# Patient Record
Sex: FEMALE | Race: BLACK OR AFRICAN AMERICAN | NOT HISPANIC OR LATINO | Employment: FULL TIME | URBAN - METROPOLITAN AREA
[De-identification: names, ages, dates, MRNs, and addresses within clinical notes are randomized per-mention and may not be internally consistent; named-entity substitution may affect disease eponyms.]

---

## 2019-10-08 ENCOUNTER — HOSPITAL ENCOUNTER (OUTPATIENT)
Dept: URGENT CARE | Facility: CLINIC | Age: 24
Discharge: HOME | End: 2019-10-08
Attending: FAMILY MEDICINE

## 2020-01-13 ENCOUNTER — HOSPITAL ENCOUNTER (OUTPATIENT)
Dept: URGENT CARE | Facility: CLINIC | Age: 25
Discharge: HOME | End: 2020-01-13

## 2020-01-15 LAB — BACTERIA SPEC AEROBE CULT: NORMAL

## 2020-10-19 ENCOUNTER — OFFICE VISIT CONVERTED (OUTPATIENT)
Dept: FAMILY MEDICINE CLINIC | Facility: CLINIC | Age: 25
End: 2020-10-19
Attending: NURSE PRACTITIONER

## 2020-10-27 ENCOUNTER — HOSPITAL ENCOUNTER (OUTPATIENT)
Dept: OTHER | Facility: HOSPITAL | Age: 25
Discharge: HOME | End: 2020-10-27
Attending: NURSE PRACTITIONER

## 2020-10-30 ENCOUNTER — HOSPITAL ENCOUNTER (OUTPATIENT)
Dept: FAMILY MEDICINE CLINIC | Facility: CLINIC | Age: 25
Discharge: HOME | End: 2020-10-30
Attending: NURSE PRACTITIONER

## 2020-10-30 ENCOUNTER — OFFICE VISIT CONVERTED (OUTPATIENT)
Dept: FAMILY MEDICINE CLINIC | Facility: CLINIC | Age: 25
End: 2020-10-30
Attending: NURSE PRACTITIONER

## 2020-10-30 LAB
C TRACH RRNA CVX QL NAA+PROBE: NOT DETECTED
N GONORRHOEA DNA SPEC QL NAA+PROBE: NOT DETECTED

## 2020-11-03 LAB
CONV LAST MENSTURAL PERIOD: NORMAL
SPECIMEN SOURCE: NORMAL
SPECIMEN SOURCE: NORMAL
THIN PREP CVX: NORMAL

## 2020-12-18 ENCOUNTER — HOSPITAL ENCOUNTER (OUTPATIENT)
Dept: URGENT CARE | Facility: CLINIC | Age: 25
Discharge: HOME | End: 2020-12-18
Attending: FAMILY MEDICINE

## 2021-05-01 ENCOUNTER — HOSPITAL ENCOUNTER (OUTPATIENT)
Dept: URGENT CARE | Facility: CLINIC | Age: 26
Discharge: HOME | End: 2021-05-01
Attending: EMERGENCY MEDICINE

## 2021-05-02 LAB — SARS-COV-2 RNA SPEC QL NAA+PROBE: NOT DETECTED

## 2021-05-13 NOTE — PROGRESS NOTES
Progress Note      Patient Name: Analisa Isaac   Patient ID: [id number]   Sex: Female   YOB: 1995    Primary Care Provider: Arabella DUNCAN   Referring Provider: Arabella DUNCAN    Visit Date: October 19, 2020    Provider: PERLA Richard   Location: [facility]   Location Address: [address] [place] [address], [address] [place] [address]  [id number]   Location Phone: [phone]          Chief Complaint  · establish care  · left knee pain, gives out on her x 1 month      History Of Present Illness  Analisa Isaac is a 24 year old /Black female who presents for evaluation and treatment of:      She is here for an acute visit but also to establish care.  She is a previous patient of PERLA Morrison and has not been here for almost 3 years.    She is complaining of left knee pain for about 1 month.  She states that it sometimes feels like her knee is going to give out on her.  She denies any injuries or falls.  She has not tried any over-the-counter medications.  She states it is worse when she is weight bearing, some better when she rests.  She has tried a knee brace which helped some.    History of asthma: She uses Ventolin inhaler as needed only and needs a refill.    She has not had a Pap smear since she was 20.       Past Medical History  Disease Name Date Onset Notes   Asthma --  --          Medication List  Name Date Started Instructions   Ventolin HFA 90 mcg/actuation inhalation HFA aerosol inhaler 11/14/2017 inhale 1 - 2 puffs (90 - 180 mcg) by inhalation route every 4-6 hours as needed         Allergy List  Allergen Name Date Reaction Notes   NO KNOWN DRUG ALLERGIES --  --  --          Family Medical History  Disease Name Relative/Age Notes   Breast Neoplasm Aunt/40   maternal         Reproductive History  Menstrual   Last Menstrual Period: 01/01/2012   Pregnancy Summary   Total Pregnancies: 0 Full Term: 0 Premature: 0   Ab Induced: 0 Ab  "Spontaneous: 0 Ectopics: 0   Multiples: 0 Livin         Social History  Finding Status Start/Stop Quantity Notes   Alcohol Current some day --/-- --  --    Tobacco Never --/-- --  --          Review of Systems  · Constitutional  o Denies  o : fever, fatigue, weight loss, weight gain  · Cardiovascular  o Denies  o : lower extremity edema, claudication, chest pressure, palpitations  · Respiratory  o Denies  o : shortness of breath, wheezing, cough, hemoptysis, dyspnea on exertion  · Gastrointestinal  o Denies  o : nausea, vomiting, diarrhea, constipation, abdominal pain  · Genitourinary  o Denies  o : urgency, frequency, possible pregnancy  · Integument  o Denies  o : rash, itching  · Musculoskeletal  o Admits  o : knee pain  o Denies  o : joint swelling, limitation of motion      Vitals  Date Time BP Position Site L\R Cuff Size HR RR TEMP (F) WT  HT  BMI kg/m2 BSA m2 O2 Sat FR L/min FiO2        10/19/2020 09:18 /90 Sitting    95 - R  97.8 260lbs 0oz 5'  6\" 41.96 2.34 96 %            Physical Examination  · Constitutional  o Appearance  o : no acute distress, well-nourished  · Head and Face  o Head  o :   § Inspection  § : atraumatic, normocephalic  · Neck  o Thyroid  o : gland size normal, nontender, no nodules or masses present on palpation, symmetric  · Respiratory  o Respiratory Effort  o : breathing comfortably, symmetric chest rise  o Auscultation of Lungs  o : clear to asculatation bilaterally, no wheezes, rales, or rhonchii  · Cardiovascular  o Heart  o :   § Auscultation of Heart  § : regular rate and rhythm, no murmurs, rubs, or gallops  o Peripheral Vascular System  o :   § Extremities  § : no edema  · Lymphatic  o Neck  o : no lymphadenopathy present  · Neurologic  o Mental Status Examination  o :   § Orientation  § : grossly oriented to person, place and time  o Gait and Station  o :   § Gait Screening  § : normal gait  · Psychiatric  o General  o : normal mood and affect  · Right " Knee  o Inspection  o : no redness, no swelling, no deformity, no bruising, no effusion  o Palpation  o : non-tender  · Left Knee  o Inspection  o : no redness, no swelling, no deformity, no bruising, no effusion  o Palpation  o : non-tender          Assessment  · Asthma     493.90/J45.909  · Left knee pain     719.46/M25.562      Plan  · Orders  o ACO-39: Current medications updated and reviewed (1159F, ) - - 10/19/2020  o Knee (Left) 3 views X-Ray [facility]erred [facility] ([id number]) - 719.46/M25.562 - 10/19/2020  · Medications  o meloxicam 15 mg oral tablet   SIG: take 1 tablet (15 mg) by oral route once daily for 30 days   DISP: (30) Tablet with 0 refills  Prescribed on 10/19/2020     o Ventolin HFA 90 mcg/actuation inhalation HFA aerosol inhaler   SIG: inhale 1 - 2 puffs (90 - 180 mcg) by inhalation route every 4-6 hours as needed for 30 days   DISP: (1) Inhaler with 5 refills  Adjusted on 10/19/2020     o Medications have been Reconciled  o Transition of Care or Provider Policy  · Instructions  o Patient was educated/instructed on their diagnosis, treatment and medications prior to discharge from the clinic today.  o Patient instructed to seek medical attention urgently for new or worsening symptoms.  o Call the office with any concerns or questions.     We will get an x-ray of her left knee and will start her on meloxicam 15 mg once daily.  I advised her not to take any over-the-counter NSAIDs but she can take Tylenol if needed.    Patient to schedule Pap smear and we will reevaluate her knee at that time.             Electronically Signed by: PERLA Richard -Author on October 19, 2020 09:40:57 AM

## 2021-05-13 NOTE — PROGRESS NOTES
Progress Note      Patient Name: Analisa Isaac   Patient ID: [id number]   Sex: Female   YOB: 1995    Primary Care Provider: Arabella DUNCAN   Referring Provider: Arabella DUNCAN    Visit Date: October 30, 2020    Provider: PERLA Richard   [place] [facility]   [place] Address: [address] [place] [address], [address]  [id number]   Location Phone: [phone]          Chief Complaint  · Annual Exam  · PAP exam  · (Health Maintainence Information Reviewed Under Results)      History Of Present Illness  Last PAP Smear: 2015.   Date of Last Mammogram: N/A.   Date of Last Colonoscopy: N/A   No current complaints.   Analisa Isaac is a 25 year old /Black female who presents for evaluation and treatment of:      She is here for Pap smear.  Last menstrual period was 10/8/2020.  She denies any complaints but she would like to have STD testing.    She has history of left knee pain and was seen on 10/19/2020.  X-ray of the left knee was negative.  She is still complaining of pain in her left knee.  She was given a prescription for meloxicam but states she has not tried it yet.  She states she will try it this weekend.       Past Medical History  Disease Name Date Onset Notes   Asthma --  --    Asthma 10/19/2020 --    Left knee pain 10/19/2020 --          Medication List  Name Date Started Instructions   meloxicam 15 mg oral tablet 10/19/2020 take 1 tablet (15 mg) by oral route once daily for 30 days   Ventolin HFA 90 mcg/actuation inhalation HFA aerosol inhaler 10/19/2020 inhale 1 - 2 puffs (90 - 180 mcg) by inhalation route every 4-6 hours as needed for 30 days         Allergy List  Allergen Name Date Reaction Notes   NO KNOWN DRUG ALLERGIES --  --  --          Family Medical History  Disease Name Relative/Age Notes   Breast Neoplasm Aunt/40   maternal         Reproductive History  Menstrual   Last Menstrual Period: 01/01/2012  "  Pregnancy Summary   Total Pregnancies: 0 Full Term: 0 Premature: 0   Ab Induced: 0 Ab Spontaneous: 0 Ectopics: 0   Multiples: 0 Livin         Social History  Finding Status Start/Stop Quantity Notes   Alcohol Current some day --/-- --  --    Tobacco Never --/-- --  --          Review of Systems  · Constitutional  o Denies  o : appetite change, fatigue, night sweats, weight gain, weight loss  · HENT  o Denies  o : hearing loss, tinnitus, vertigo, nasal discharge, nose bleeding, dental problems, oral lesions, sore throat  · Breasts  o Denies  o : lumps, tenderness, swelling, nipple discharge  · Cardiovascular  o Denies  o : chest pain, decreased exercise tolerance, dyspnea on exertion, palpitations  · Respiratory  o Denies  o : cough, shortness of breath, wheezing, snoring, apneas  · Gastrointestinal  o Denies  o : abdominal pain, nausea, vomiting, dysphagia, heartburn, changes in bowel habits, constipation, diarrhea  · Genitourinary  o Denies  o : dysuria, hematuria, incontinence, nocturia, frequency, urgency, sexual dysfunction  · Neurologic  o Denies  o : abnormal gait, facial weakness, headache, memory difficulties, tingling or numbness, seizures, tremors  · Musculoskeletal  o Admits  o : knee pain  o Denies  o : joint swelling  · Psychiatric  o Denies  o : anxiety, decreased concentration, irritability, panic attacks, sleep distrubances, sadness/tearfulness      Vitals  Date Time BP Position Site L\R Cuff Size HR RR TEMP (F) WT  HT  BMI kg/m2 BSA m2 O2 Sat FR L/min FiO2        10/30/2020 10:41 /80 Sitting    99 - R  98 262lbs 8oz 5'  6\" 42.37 2.35 96 %            Physical Examination  · Constitutional  o Appearance  o : well-nourished, in no acute distress  · Neck  o Inspection/Palpation  o : normal appearance, no masses or tenderness, trachea midline  o Thyroid  o : gland size normal, nontender, no nodules or masses present on palpation  · Respiratory  o Respiratory Effort  o : breathing " unlabored  o Inspection of Chest  o : normal appearance  o Auscultation of Lungs  o : normal breath sounds throughout  · Cardiovascular  o Heart  o :   § Auscultation of Heart  § : regular rate and rhythm, no murmurs, gallops or rubs  · Breasts  o Inspection of Breasts  o : breasts symmetrical, no skin changes, no deformities present, no discharge present  o Palpation of Breasts, Axillae  o : no masses present on palpation, no breast tenderness  · Gastrointestinal  o Abdominal Examination  o : abdomen nontender to palpation  · Genitourinary  o External Genitalia  o : no inflammation, no lesions present  o Vagina  o : normal vaginal vault, no discharge present, no inflammatory lesions present, no masses present  o Bladder  o : nontender to palpation  o Cervix  o : cervix not visualized due to obesity of patient, nontender to palpation, no discharges, no bleeding present  o Uterus  o : nontender to palpation, no masses present, position midline/midplane  o Adnexa  o : no tenderness or masses present on bimanual examination  o Perineum  o : perineum within normal limits  · Lymphatic  o Neck  o : no lymphadenopathy present  o Axilla  o : no lymphadenopathy present  · Neurologic  o Mental Status Examination  o :   § Orientation  § : grossly oriented to person, place and time  o Gait and Station  o : normal gait, able to stand without difficulty  · Psychiatric  o Judgement and Insight  o : judgment and insight intact  o Mood and Affect  o : mood normal, affect appropriate          Assessment  · Routine gynecological examination     V72.31/Z01.419  · Need for Prophylactic Flu Vaccine     V04.81  · Pap Smear     V76.2/Z01.419  · Left knee pain     719.46/M25.562      Plan  · Orders  o Vaginal Screen (Candida, Gardnerella & Trichomonas) (71746) - V72.31/Z01.419 - 10/30/2020  o Chlamydia/GC by PCR (Urine or Vaginal Swab) [facility] ([facility]) - V72.31/Z01.419 - 10/30/2020  o Pap smear (65374) - V76.2/Z01.419 -  10/30/2020  o Immunization Admin Fee (Single) ([facility]) (49986) - V04.81 - 10/30/2020  o ACO-39: Current medications updated and reviewed (, 1159F) - - 10/30/2020  o Fluzone Quadrivalent Vaccine, age 6 months + (65779) - V04.81 - 10/30/2020   Vaccine - Influenza; Dose: 0.5; Site: Left Deltoid; Route: Intramuscular; Date: 10/30/2020 11:08:00; Exp: 06/30/2021; Lot: [id number]; Mfg: sanofi pasteur; TradeName: Fluzone Quadrivalent; Administered By: Danny Lu MA; Comment: Patient tolerated injection well, left in stable condition.  · Medications  o Medications have been Reconciled  o Transition of Care or Provider Policy  · Instructions  o **Pap Test/Liquid Based:   o Thin Prep  o Source:   o Cervix  o ********  o **Perform Reflex Human Papilloma Virus (HPV) High Risk on this Pap (If atypical squamous cells of the undetermined signifigcance (ASCUS)/Atypical Glandular Cells of undetermined significance (AGCUS): Low Grade Squamous Intraepitheal lesion (LGSIL): **  o ********  o Medicare:  o No  o **Is this an annual PAP:  o Yes  o **Clinical History  o Last Menstrual Period (First Day of): 10/08/2020  o Irregular Mensus:  o Previous Pap date: 2015  o Counseled on monthly breast self exams.   o Counseled on STD prevention.  o Patient was educated/instructed on their diagnosis, treatment and medications prior to discharge from the clinic today.  o Patient instructed to seek medical attention urgently for new or worsening symptoms.  o Call the office with any concerns or questions.  · Disposition  o Call or Return if symptoms worsen or persist.            Electronically Signed by: PERLA Richard -Author on October 30, 2020 12:09:55 PM

## 2021-05-14 VITALS
TEMPERATURE: 97.8 F | WEIGHT: 260 LBS | SYSTOLIC BLOOD PRESSURE: 146 MMHG | OXYGEN SATURATION: 96 % | HEIGHT: 66 IN | HEART RATE: 95 BPM | DIASTOLIC BLOOD PRESSURE: 90 MMHG | BODY MASS INDEX: 41.78 KG/M2

## 2021-05-14 VITALS
SYSTOLIC BLOOD PRESSURE: 140 MMHG | DIASTOLIC BLOOD PRESSURE: 80 MMHG | HEART RATE: 99 BPM | TEMPERATURE: 98 F | BODY MASS INDEX: 42.19 KG/M2 | WEIGHT: 262.5 LBS | OXYGEN SATURATION: 96 % | HEIGHT: 66 IN

## 2021-06-03 PROBLEM — J45.909 ASTHMA: Status: ACTIVE | Noted: 2021-06-03

## 2021-06-03 PROBLEM — M25.562 LEFT KNEE PAIN: Status: ACTIVE | Noted: 2021-06-03

## 2021-06-03 RX ORDER — MELOXICAM 15 MG/1
15 TABLET ORAL DAILY
COMMUNITY
End: 2021-06-07

## 2021-06-03 RX ORDER — ALBUTEROL SULFATE 90 UG/1
2 AEROSOL, METERED RESPIRATORY (INHALATION) EVERY 4 HOURS PRN
COMMUNITY
End: 2022-05-16 | Stop reason: SDUPTHER

## 2021-06-07 ENCOUNTER — OFFICE VISIT (OUTPATIENT)
Dept: FAMILY MEDICINE CLINIC | Facility: CLINIC | Age: 26
End: 2021-06-07

## 2021-06-07 VITALS
WEIGHT: 268 LBS | BODY MASS INDEX: 44.65 KG/M2 | SYSTOLIC BLOOD PRESSURE: 124 MMHG | OXYGEN SATURATION: 96 % | TEMPERATURE: 98.7 F | DIASTOLIC BLOOD PRESSURE: 84 MMHG | HEART RATE: 83 BPM | HEIGHT: 65 IN

## 2021-06-07 DIAGNOSIS — J45.20 MILD INTERMITTENT ASTHMA WITHOUT COMPLICATION: Primary | ICD-10-CM

## 2021-06-07 DIAGNOSIS — E66.01 CLASS 3 SEVERE OBESITY DUE TO EXCESS CALORIES WITH BODY MASS INDEX (BMI) OF 40.0 TO 44.9 IN ADULT, UNSPECIFIED WHETHER SERIOUS COMORBIDITY PRESENT (HCC): ICD-10-CM

## 2021-06-07 DIAGNOSIS — Z83.3 FAMILY HISTORY OF DIABETES MELLITUS: ICD-10-CM

## 2021-06-07 PROBLEM — J45.909 ASTHMA: Status: RESOLVED | Noted: 2021-06-03 | Resolved: 2021-06-07

## 2021-06-07 PROBLEM — M25.562 LEFT KNEE PAIN: Status: RESOLVED | Noted: 2020-10-19 | Resolved: 2021-06-07

## 2021-06-07 PROBLEM — M25.562 LEFT KNEE PAIN: Status: ACTIVE | Noted: 2020-10-19

## 2021-06-07 PROBLEM — J45.909 ASTHMA: Status: ACTIVE | Noted: 2020-10-19

## 2021-06-07 PROCEDURE — 84443 ASSAY THYROID STIM HORMONE: CPT | Performed by: NURSE PRACTITIONER

## 2021-06-07 PROCEDURE — 83036 HEMOGLOBIN GLYCOSYLATED A1C: CPT | Performed by: NURSE PRACTITIONER

## 2021-06-07 PROCEDURE — 85025 COMPLETE CBC W/AUTO DIFF WBC: CPT | Performed by: NURSE PRACTITIONER

## 2021-06-07 PROCEDURE — 80061 LIPID PANEL: CPT | Performed by: NURSE PRACTITIONER

## 2021-06-07 PROCEDURE — 99213 OFFICE O/P EST LOW 20 MIN: CPT | Performed by: NURSE PRACTITIONER

## 2021-06-07 PROCEDURE — 80053 COMPREHEN METABOLIC PANEL: CPT | Performed by: NURSE PRACTITIONER

## 2021-06-07 NOTE — ASSESSMENT & PLAN NOTE
We will check labs today to include an A1c's with her family history of diabetes.  She will schedule a Pap smear for November 2021.

## 2021-06-07 NOTE — PROGRESS NOTES
"Chief Complaint  Asthma    Subjective          DesMonique Mell Isaac presents to [facility]  She presents today for follow-up and she would like to have an A1c checked.  She states she does have some family that has diabetes.  She is obese with a BMI of 44.  She has not had any recent lab work.    She does have asthma which is only intermittent.  She has an albuterol inhaler to use as needed but states she rarely has to use it.    Her last Pap smear was 10/30/2020 which was normal.  That was her first Pap smear and I discussed with her that she will need to have annual Pap smears x3 and then she can go to every 2 to 3 years as long as they are normal.    Objective   Vital Signs:   /84   Pulse 83   Temp 98.7 °F (37.1 °C)   Ht 165.1 cm (65\")   Wt 122 kg (268 lb)   SpO2 96%   BMI 44.60 kg/m²     Physical Exam  Vitals reviewed.   Constitutional:       Appearance: Normal appearance. She is well-developed.   HENT:      Mouth/Throat:      Pharynx: No oropharyngeal exudate.   Neck:      Thyroid: No thyroid mass, thyromegaly or thyroid tenderness.   Cardiovascular:      Rate and Rhythm: Normal rate and regular rhythm.      Heart sounds: No murmur heard.   No friction rub. No gallop.    Pulmonary:      Effort: Pulmonary effort is normal.      Breath sounds: Normal breath sounds. No wheezing or rhonchi.   Musculoskeletal:      Cervical back: Normal range of motion and neck supple.   Lymphadenopathy:      Cervical: No cervical adenopathy.   Skin:     General: Skin is warm and dry.   Neurological:      Mental Status: She is alert and oriented to person, place, and time.      Cranial Nerves: No cranial nerve deficit.   Psychiatric:         Mood and Affect: Mood and affect normal.         Behavior: Behavior normal.         Thought Content: Thought content normal. Thought content does not include homicidal or suicidal ideation.         Judgment: Judgment normal.        Result Review : "                 Assessment and Plan    Diagnoses and all orders for this visit:    1. Mild intermittent asthma without complication (Primary)  -     Hemoglobin A1c  -     Lipid Panel  -     Comprehensive Metabolic Panel  -     CBC & Differential  -     TSH Rfx On Abnormal To Free T4    2. Class 3 severe obesity due to excess calories with body mass index (BMI) of 40.0 to 44.9 in adult, unspecified whether serious comorbidity present (CMS/[facility])  Assessment & Plan:  We will check labs today to include an A1c's with her family history of diabetes.  She will schedule a Pap smear for November 2021.    Orders:  -     Hemoglobin A1c  -     Lipid Panel  -     Comprehensive Metabolic Panel  -     CBC & Differential  -     TSH Rfx On Abnormal To Free T4    3. Family history of diabetes mellitus  -     Hemoglobin A1c  -     Lipid Panel  -     Comprehensive Metabolic Panel  -     CBC & Differential  -     TSH Rfx On Abnormal To Free T4      Follow Up   Return in about 21 weeks (around 11/1/2021) for Pap smear.  Patient was given instructions and counseling regarding her condition or for health maintenance advice. Please see specific information pulled into the AVS if appropriate.

## 2021-06-08 LAB
ALBUMIN SERPL-MCNC: 4.9 G/DL (ref 3.5–5.2)
ALBUMIN/GLOB SERPL: 1.9 G/DL
ALP SERPL-CCNC: 64 U/L (ref 39–117)
ALT SERPL W P-5'-P-CCNC: 39 U/L (ref 1–33)
ANION GAP SERPL CALCULATED.3IONS-SCNC: 8.1 MMOL/L (ref 5–15)
AST SERPL-CCNC: 30 U/L (ref 1–32)
BASOPHILS # BLD AUTO: 0.05 10*3/MM3 (ref 0–0.2)
BASOPHILS NFR BLD AUTO: 0.8 % (ref 0–1.5)
BILIRUB SERPL-MCNC: 0.4 MG/DL (ref 0–1.2)
BUN SERPL-MCNC: 9 MG/DL (ref 6–20)
BUN/CREAT SERPL: 16.7 (ref 7–25)
CALCIUM SPEC-SCNC: 9.8 MG/DL (ref 8.6–10.5)
CHLORIDE SERPL-SCNC: 102 MMOL/L (ref 98–107)
CHOLEST SERPL-MCNC: 184 MG/DL (ref 0–200)
CO2 SERPL-SCNC: 29.9 MMOL/L (ref 22–29)
CREAT SERPL-MCNC: 0.54 MG/DL (ref 0.57–1)
DEPRECATED RDW RBC AUTO: 40.6 FL (ref 37–54)
EOSINOPHIL # BLD AUTO: 0.17 10*3/MM3 (ref 0–0.4)
EOSINOPHIL NFR BLD AUTO: 2.7 % (ref 0.3–6.2)
ERYTHROCYTE [DISTWIDTH] IN BLOOD BY AUTOMATED COUNT: 12.2 % (ref 12.3–15.4)
GFR SERPL CREATININE-BSD FRML MDRD: >150 ML/MIN/1.73
GLOBULIN UR ELPH-MCNC: 2.6 GM/DL
GLUCOSE SERPL-MCNC: 126 MG/DL (ref 65–99)
HBA1C MFR BLD: 6.6 % (ref 4.8–5.6)
HCT VFR BLD AUTO: 42.6 % (ref 34–46.6)
HDLC SERPL-MCNC: 30 MG/DL (ref 40–60)
HGB BLD-MCNC: 14.1 G/DL (ref 12–15.9)
IMM GRANULOCYTES # BLD AUTO: 0.02 10*3/MM3 (ref 0–0.05)
IMM GRANULOCYTES NFR BLD AUTO: 0.3 % (ref 0–0.5)
LDLC SERPL CALC-MCNC: 131 MG/DL (ref 0–100)
LDLC/HDLC SERPL: 4.31 {RATIO}
LYMPHOCYTES # BLD AUTO: 2.21 10*3/MM3 (ref 0.7–3.1)
LYMPHOCYTES NFR BLD AUTO: 35.2 % (ref 19.6–45.3)
MCH RBC QN AUTO: 29.5 PG (ref 26.6–33)
MCHC RBC AUTO-ENTMCNC: 33.1 G/DL (ref 31.5–35.7)
MCV RBC AUTO: 89.1 FL (ref 79–97)
MONOCYTES # BLD AUTO: 0.48 10*3/MM3 (ref 0.1–0.9)
MONOCYTES NFR BLD AUTO: 7.7 % (ref 5–12)
NEUTROPHILS NFR BLD AUTO: 3.34 10*3/MM3 (ref 1.7–7)
NEUTROPHILS NFR BLD AUTO: 53.3 % (ref 42.7–76)
NRBC BLD AUTO-RTO: 0.2 /100 WBC (ref 0–0.2)
PLATELET # BLD AUTO: 409 10*3/MM3 (ref 140–450)
PMV BLD AUTO: 10.9 FL (ref 6–12)
POTASSIUM SERPL-SCNC: 4.6 MMOL/L (ref 3.5–5.2)
PROT SERPL-MCNC: 7.5 G/DL (ref 6–8.5)
RBC # BLD AUTO: 4.78 10*6/MM3 (ref 3.77–5.28)
SODIUM SERPL-SCNC: 140 MMOL/L (ref 136–145)
TRIGL SERPL-MCNC: 124 MG/DL (ref 0–150)
TSH SERPL DL<=0.05 MIU/L-ACNC: 1.52 UIU/ML (ref 0.27–4.2)
VLDLC SERPL-MCNC: 23 MG/DL (ref 5–40)
WBC # BLD AUTO: 6.27 10*3/MM3 (ref 3.4–10.8)

## 2021-06-09 ENCOUNTER — TELEPHONE (OUTPATIENT)
Dept: FAMILY MEDICINE CLINIC | Facility: CLINIC | Age: 26
End: 2021-06-09

## 2021-08-22 NOTE — PROGRESS NOTES
"Chief Complaint  Follow-up (medication) and Rash (on back and neck x 1 week)    Subjective          DesMjose jque Mell Isaac presents to [facility]  History of Present Illness  She presents today with complaints of rash on back and neck.  She states the rash started about 1 week ago but denies it being itchy.  She states the rashes on her left side of her mid back.  She has some dark lines on her neck but denies itching.  She denies any new soaps or detergents.    New onset diabetes type 2, non-insulin-dependent: She was started on Metformin recently and is following up.  She states she has some loose stools right after she takes the Metformin but she denies it being very bothersome and does not want to change medication.  Objective   Vital Signs:   /90   Pulse 88   Temp 97.8 °F (36.6 °C)   Ht 165.1 cm (65\")   Wt 120 kg (263 lb 9.6 oz)   SpO2 96%   BMI 43.87 kg/m²     Physical Exam  Vitals reviewed.   Constitutional:       Appearance: Normal appearance. She is well-developed.   Cardiovascular:      Rate and Rhythm: Normal rate and regular rhythm.      Heart sounds: No murmur heard.   No friction rub. No gallop.    Pulmonary:      Effort: Pulmonary effort is normal.      Breath sounds: Normal breath sounds. No wheezing or rhonchi.   Skin:     General: Skin is warm and dry.             Comments: Acanthosis nigricans noted to posterior neck.   Neurological:      Mental Status: She is alert and oriented to person, place, and time.      Cranial Nerves: No cranial nerve deficit.   Psychiatric:         Mood and Affect: Mood and affect normal.         Behavior: Behavior normal.         Thought Content: Thought content normal. Thought content does not include homicidal or suicidal ideation.         Judgment: Judgment normal.        Result Review :                 Assessment and Plan    Diagnoses and all orders for this visit:    1. Type 2 diabetes mellitus without complication, " without long-term current use of insulin (CMS/[facility]) (Primary)  Assessment & Plan:  Diabetes is newly identified.   Continue current treatment regimen.  Discussed foot care.  Reminded to get yearly retinal exam.  Diabetes will be reassessed in 3 months.      2. Rash and nonspecific skin eruption  Comments:  We will treat with triamcinolone cream.    3. Acanthosis nigricans  Assessment & Plan:  Discussed this condition is related to her diabetes.      Other orders  -     triamcinolone (KENALOG) 0.1 % ointment; Apply  topically to the appropriate area as directed 2 (Two) Times a Day.  Dispense: 15 g; Refill: 0      Follow Up   Return for has follow up apt scheduled 11/5/21.  Patient was given instructions and counseling regarding her condition or for health maintenance advice. Please see specific information pulled into the AVS if appropriate.

## 2021-08-23 ENCOUNTER — OFFICE VISIT (OUTPATIENT)
Dept: FAMILY MEDICINE CLINIC | Facility: CLINIC | Age: 26
End: 2021-08-23

## 2021-08-23 VITALS
BODY MASS INDEX: 43.92 KG/M2 | HEART RATE: 88 BPM | OXYGEN SATURATION: 96 % | DIASTOLIC BLOOD PRESSURE: 90 MMHG | WEIGHT: 263.6 LBS | HEIGHT: 65 IN | TEMPERATURE: 97.8 F | SYSTOLIC BLOOD PRESSURE: 134 MMHG

## 2021-08-23 DIAGNOSIS — E11.9 TYPE 2 DIABETES MELLITUS WITHOUT COMPLICATION, WITHOUT LONG-TERM CURRENT USE OF INSULIN (HCC): Primary | ICD-10-CM

## 2021-08-23 DIAGNOSIS — L83 ACANTHOSIS NIGRICANS: ICD-10-CM

## 2021-08-23 DIAGNOSIS — R21 RASH AND NONSPECIFIC SKIN ERUPTION: ICD-10-CM

## 2021-08-23 PROCEDURE — 99213 OFFICE O/P EST LOW 20 MIN: CPT | Performed by: NURSE PRACTITIONER

## 2021-08-23 NOTE — ASSESSMENT & PLAN NOTE
Diabetes is newly identified.   Continue current treatment regimen.  Discussed foot care.  Reminded to get yearly retinal exam.  Diabetes will be reassessed in 3 months.

## 2021-09-02 NOTE — PROGRESS NOTES
"Chief Complaint  Leg Pain (numbness and tingling left leg)    Subjective          Analisa Mell Isaac presents to [facility]  History of Present Illness  She is here for an acute visit today.  She is complaining of numbness and tingling in her left leg from her knee down for the past few days.  She states the numbness and tingling starts about her knee and goes down to her toes on her left side.  She states her feet were swelling but are not swollen now.  She states on Wednesday she did have some mid back pain but it has improved.  She denies any pain in her knee or calf and denies any swelling in her leg.    She does have a new onset of diabetes type 2, non-insulin-dependent.  Her A1c was 6.6% 3 months ago.  She was started on Metformin and states she is tolerating the medication well.    Patient was also seen by Blanca Cordero, NP student.    Objective   Vital Signs:   /68   Pulse 86   Temp 98.1 °F (36.7 °C)   Ht 165.1 cm (65\")   Wt 118 kg (260 lb 3.2 oz)   SpO2 98%   BMI 43.30 kg/m²     Physical Exam  Vitals reviewed.   Constitutional:       Appearance: Normal appearance. She is well-developed.   Neck:      Thyroid: No thyroid mass, thyromegaly or thyroid tenderness.   Cardiovascular:      Rate and Rhythm: Normal rate and regular rhythm.      Heart sounds: No murmur heard.   No friction rub. No gallop.    Pulmonary:      Effort: Pulmonary effort is normal.      Breath sounds: Normal breath sounds. No wheezing or rhonchi.   Musculoskeletal:      Thoracic back: Spasms present. No tenderness.      Right lower leg: No swelling or tenderness.      Left lower leg: No swelling or tenderness.   Lymphadenopathy:      Cervical: No cervical adenopathy.   Skin:     General: Skin is warm and dry.   Neurological:      Mental Status: She is alert and oriented to person, place, and time.      Cranial Nerves: No cranial nerve deficit.   Psychiatric:         Mood and Affect: Mood " and affect normal.         Behavior: Behavior normal.         Thought Content: Thought content normal. Thought content does not include homicidal or suicidal ideation.         Judgment: Judgment normal.        Result Review :                 Assessment and Plan    Diagnoses and all orders for this visit:    1. Numbness and tingling of left leg (Primary)  Assessment & Plan:  I discussed with her that the numbness and tingling could be due to her back pain or it could be due to neuropathy from diabetes.  I also discussed it could be vitamin B12 deficiency so we will check her B12 level today.    Orders:  -     Vitamin B12 & Folate    2. Type 2 diabetes mellitus without complication, without long-term current use of insulin (CMS/[facility])  Assessment & Plan:  Diabetes is newly identified.   Continue current treatment regimen.  Diabetes will be reassessed with an A1c level with labs today.    Orders:  -     Hemoglobin A1c  -     CBC & Differential  -     Comprehensive Metabolic Panel    3. Acute left-sided thoracic back pain  Comments:  I will start her on ibuprofen 800 mg 3 times daily as needed and Flexeril 10 mg nightly as needed for her pain.  Advised not to drive while taking Flexeril.    Other orders  -     ibuprofen (ADVIL,MOTRIN) 800 MG tablet; Take 1 tablet by mouth Every 8 (Eight) Hours As Needed for Mild Pain .  Dispense: 90 tablet; Refill: 0  -     cyclobenzaprine (FLEXERIL) 10 MG tablet; Take 1 tablet by mouth At Night As Needed for Muscle Spasms.  Dispense: 30 tablet; Refill: 0      Follow Up   Return if symptoms worsen or fail to improve, for has f/u scheduled.  Patient was given instructions and counseling regarding her condition or for health maintenance advice. Please see specific information pulled into the AVS if appropriate.

## 2021-09-03 ENCOUNTER — OFFICE VISIT (OUTPATIENT)
Dept: FAMILY MEDICINE CLINIC | Facility: CLINIC | Age: 26
End: 2021-09-03

## 2021-09-03 VITALS
TEMPERATURE: 98.1 F | HEIGHT: 65 IN | OXYGEN SATURATION: 98 % | BODY MASS INDEX: 43.35 KG/M2 | DIASTOLIC BLOOD PRESSURE: 68 MMHG | WEIGHT: 260.2 LBS | HEART RATE: 86 BPM | SYSTOLIC BLOOD PRESSURE: 132 MMHG

## 2021-09-03 DIAGNOSIS — M54.6 ACUTE LEFT-SIDED THORACIC BACK PAIN: ICD-10-CM

## 2021-09-03 DIAGNOSIS — R20.0 NUMBNESS AND TINGLING OF LEFT LEG: Primary | ICD-10-CM

## 2021-09-03 DIAGNOSIS — R20.2 NUMBNESS AND TINGLING OF LEFT LEG: Primary | ICD-10-CM

## 2021-09-03 DIAGNOSIS — E11.9 TYPE 2 DIABETES MELLITUS WITHOUT COMPLICATION, WITHOUT LONG-TERM CURRENT USE OF INSULIN (HCC): ICD-10-CM

## 2021-09-03 LAB
ALBUMIN SERPL-MCNC: 5.1 G/DL (ref 3.5–5.2)
ALBUMIN/GLOB SERPL: 1.8 G/DL
ALP SERPL-CCNC: 70 U/L (ref 39–117)
ALT SERPL W P-5'-P-CCNC: 41 U/L (ref 1–33)
ANION GAP SERPL CALCULATED.3IONS-SCNC: 9.3 MMOL/L (ref 5–15)
AST SERPL-CCNC: 24 U/L (ref 1–32)
BASOPHILS # BLD AUTO: 0.03 10*3/MM3 (ref 0–0.2)
BASOPHILS NFR BLD AUTO: 0.5 % (ref 0–1.5)
BILIRUB SERPL-MCNC: 0.3 MG/DL (ref 0–1.2)
BUN SERPL-MCNC: 9 MG/DL (ref 6–20)
BUN/CREAT SERPL: 14.8 (ref 7–25)
CALCIUM SPEC-SCNC: 9.9 MG/DL (ref 8.6–10.5)
CHLORIDE SERPL-SCNC: 100 MMOL/L (ref 98–107)
CO2 SERPL-SCNC: 28.7 MMOL/L (ref 22–29)
CREAT SERPL-MCNC: 0.61 MG/DL (ref 0.57–1)
DEPRECATED RDW RBC AUTO: 37.8 FL (ref 37–54)
EOSINOPHIL # BLD AUTO: 0.19 10*3/MM3 (ref 0–0.4)
EOSINOPHIL NFR BLD AUTO: 2.9 % (ref 0.3–6.2)
ERYTHROCYTE [DISTWIDTH] IN BLOOD BY AUTOMATED COUNT: 12.2 % (ref 12.3–15.4)
FOLATE SERPL-MCNC: 9.02 NG/ML (ref 4.78–24.2)
GFR SERPL CREATININE-BSD FRML MDRD: 144 ML/MIN/1.73
GLOBULIN UR ELPH-MCNC: 2.9 GM/DL
GLUCOSE SERPL-MCNC: 88 MG/DL (ref 65–99)
HBA1C MFR BLD: 7.03 % (ref 4.8–5.6)
HCT VFR BLD AUTO: 40.8 % (ref 34–46.6)
HGB BLD-MCNC: 13.5 G/DL (ref 12–15.9)
IMM GRANULOCYTES # BLD AUTO: 0.03 10*3/MM3 (ref 0–0.05)
IMM GRANULOCYTES NFR BLD AUTO: 0.5 % (ref 0–0.5)
LYMPHOCYTES # BLD AUTO: 2.56 10*3/MM3 (ref 0.7–3.1)
LYMPHOCYTES NFR BLD AUTO: 39.6 % (ref 19.6–45.3)
MCH RBC QN AUTO: 28.5 PG (ref 26.6–33)
MCHC RBC AUTO-ENTMCNC: 33.1 G/DL (ref 31.5–35.7)
MCV RBC AUTO: 86.1 FL (ref 79–97)
MONOCYTES # BLD AUTO: 0.58 10*3/MM3 (ref 0.1–0.9)
MONOCYTES NFR BLD AUTO: 9 % (ref 5–12)
NEUTROPHILS NFR BLD AUTO: 3.08 10*3/MM3 (ref 1.7–7)
NEUTROPHILS NFR BLD AUTO: 47.5 % (ref 42.7–76)
NRBC BLD AUTO-RTO: 0 /100 WBC (ref 0–0.2)
PLATELET # BLD AUTO: 393 10*3/MM3 (ref 140–450)
PMV BLD AUTO: 11 FL (ref 6–12)
POTASSIUM SERPL-SCNC: 4.4 MMOL/L (ref 3.5–5.2)
PROT SERPL-MCNC: 8 G/DL (ref 6–8.5)
RBC # BLD AUTO: 4.74 10*6/MM3 (ref 3.77–5.28)
SODIUM SERPL-SCNC: 138 MMOL/L (ref 136–145)
VIT B12 BLD-MCNC: 496 PG/ML (ref 211–946)
WBC # BLD AUTO: 6.47 10*3/MM3 (ref 3.4–10.8)

## 2021-09-03 PROCEDURE — 82607 VITAMIN B-12: CPT | Performed by: NURSE PRACTITIONER

## 2021-09-03 PROCEDURE — 85025 COMPLETE CBC W/AUTO DIFF WBC: CPT | Performed by: NURSE PRACTITIONER

## 2021-09-03 PROCEDURE — 80053 COMPREHEN METABOLIC PANEL: CPT | Performed by: NURSE PRACTITIONER

## 2021-09-03 PROCEDURE — 83036 HEMOGLOBIN GLYCOSYLATED A1C: CPT | Performed by: NURSE PRACTITIONER

## 2021-09-03 PROCEDURE — 82746 ASSAY OF FOLIC ACID SERUM: CPT | Performed by: NURSE PRACTITIONER

## 2021-09-03 PROCEDURE — 99213 OFFICE O/P EST LOW 20 MIN: CPT | Performed by: NURSE PRACTITIONER

## 2021-09-03 RX ORDER — IBUPROFEN 800 MG/1
800 TABLET ORAL EVERY 8 HOURS PRN
Qty: 90 TABLET | Refills: 0 | OUTPATIENT
Start: 2021-09-03 | End: 2021-09-15

## 2021-09-03 RX ORDER — CYCLOBENZAPRINE HCL 10 MG
10 TABLET ORAL NIGHTLY PRN
Qty: 30 TABLET | Refills: 0 | Status: SHIPPED | OUTPATIENT
Start: 2021-09-03 | End: 2023-02-28

## 2021-09-03 NOTE — ASSESSMENT & PLAN NOTE
I discussed with her that the numbness and tingling could be due to her back pain or it could be due to neuropathy from diabetes.  I also discussed it could be vitamin B12 deficiency so we will check her B12 level today.

## 2021-09-03 NOTE — PATIENT INSTRUCTIONS
Acute Back Pain, Adult  Acute back pain is sudden and usually short-lived. It is often caused by an injury to the muscles and tissues in the back. The injury may result from:  · A muscle or ligament getting overstretched or torn (strained). Ligaments are tissues that connect bones to each other. Lifting something improperly can cause a back strain.  · Wear and tear (degeneration) of the spinal disks. Spinal disks are circular tissue that provide cushioning between the bones of the spine (vertebrae).  · Twisting motions, such as while playing sports or doing yard work.  · A hit to the back.  · Arthritis.  You may have a physical exam, lab tests, and imaging tests to find the cause of your pain. Acute back pain usually goes away with rest and home care.  Follow these instructions at home:  Managing pain, stiffness, and swelling  · Treatment may include medicines for pain and inflammation that are taken by mouth or applied to the skin, prescription pain medicine, or muscle relaxants. Take over-the-counter and prescription medicines only as told by your health care provider.  · Your health care provider may recommend applying ice during the first 24-48 hours after your pain starts. To do this:  ? Put ice in a plastic bag.  ? Place a towel between your skin and the bag.  ? Leave the ice on for 20 minutes, 2-3 times a day.  · If directed, apply heat to the affected area as often as told by your health care provider. Use the heat source that your health care provider recommends, such as a moist heat pack or a heating pad.  ? Place a towel between your skin and the heat source.  ? Leave the heat on for 20-30 minutes.  ? Remove the heat if your skin turns bright red. This is especially important if you are unable to feel pain, heat, or cold. You have a greater risk of getting burned.  Activity    · Do not stay in bed. Staying in bed for more than 1-2 days can delay your recovery.  · Sit up and stand up straight. Avoid  leaning forward when you sit or hunching over when you stand.  ? If you work at a desk, sit close to it so you do not need to lean over. Keep your chin tucked in. Keep your neck drawn back, and keep your elbows bent at a 90-degree angle (right angle).  ? Sit high and close to the steering wheel when you drive. Add lower back (lumbar) support to your car seat, if needed.  · Take short walks on even surfaces as soon as you are able. Try to increase the length of time you walk each day.  · Do not sit, drive, or  one place for more than 30 minutes at a time. Sitting or standing for long periods of time can put stress on your back.  · Do not drive or use heavy machinery while taking prescription pain medicine.  · Use proper lifting techniques. When you bend and lift, use positions that put less stress on your back:  ? Bend your knees.  ? Keep the load close to your body.  ? Avoid twisting.  · Exercise regularly as told by your health care provider. Exercising helps your back heal faster and helps prevent back injuries by keeping muscles strong and flexible.  · Work with a physical therapist to make a safe exercise program, as recommended by your health care provider. Do any exercises as told by your physical therapist.  Lifestyle  · Maintain a healthy weight. Extra weight puts stress on your back and makes it difficult to have good posture.  · Avoid activities or situations that make you feel anxious or stressed. Stress and anxiety increase muscle tension and can make back pain worse. Learn ways to manage anxiety and stress, such as through exercise.  General instructions  · Sleep on a firm mattress in a comfortable position. Try lying on your side with your knees slightly bent. If you lie on your back, put a pillow under your knees.  · Follow your treatment plan as told by your health care provider. This may include:  ? Cognitive or behavioral therapy.  ? Acupuncture or massage therapy.  ? Meditation or  yoga.  Contact a health care provider if:  · You have pain that is not relieved with rest or medicine.  · You have increasing pain going down into your legs or buttocks.  · Your pain does not improve after 2 weeks.  · You have pain at night.  · You lose weight without trying.  · You have a fever or chills.  Get help right away if:  · You develop new bowel or bladder control problems.  · You have unusual weakness or numbness in your arms or legs.  · You develop nausea or vomiting.  · You develop abdominal pain.  · You feel faint.  Summary  · Acute back pain is sudden and usually short-lived.  · Use proper lifting techniques. When you bend and lift, use positions that put less stress on your back.  · Take over-the-counter and prescription medicines and apply heat or ice as directed by your health care provider.  This information is not intended to replace advice given to you by your health care provider. Make sure you discuss any questions you have with your health care provider.  Document Revised: 12/11/2020 Document Reviewed: 08/01/2018  [employer] Patient Education © 2021 [employer]    Paresthesia  Paresthesia is a burning or prickling feeling. This feeling can happen in any part of the body. It often happens in the hands, arms, legs, or feet. Usually, it is not painful. In most cases, the feeling goes away in a short time and is not a sign of a serious problem. If you have paresthesia that lasts a long time, you may need to be seen by your doctor.  Follow these instructions at home:  Alcohol use    · Do not drink alcohol if:  ? Your doctor tells you not to drink.  ? You are pregnant, may be pregnant, or are planning to become pregnant.  · If you drink alcohol:  ? Limit how much you use to:  § 0-1 drink a day for women.  § 0-2 drinks a day for men.  ? Be aware of how much alcohol is in your drink. In the [place] one drink equals one 12 oz bottle of beer (355 mL), one 5 oz glass of wine (148 mL), or one 1½ oz glass  of hard liquor (44 mL).  Nutrition    · Eat a healthy diet. This includes:  ? Eating foods that have a lot of fiber in them, such as fresh fruits and vegetables, whole grains, and beans.  ? Limiting foods that have a lot of fat and processed sugars in them, such as fried or sweet foods.  General instructions  · Take over-the-counter and prescription medicines only as told by your doctor.  · Do not use any products that have nicotine or tobacco in them, such as cigarettes and e-cigarettes. If you need help quitting, ask your doctor.  · If you have diabetes, work with your doctor to make sure your blood sugar stays in a healthy range.  · If your feet feel numb:  ? Check for redness, warmth, and swelling every day.  ? Wear padded socks and comfortable shoes. These help protect your feet.  · Keep all follow-up visits as told by your doctor. This is important.  Contact a doctor if:  · You have paresthesia that gets worse or does not go away.  · Your burning or prickling feeling gets worse when you walk.  · You have pain or cramps.  · You feel dizzy.  · You have a rash.  Get help right away if you:  · Feel weak.  · Have trouble walking or moving.  · Have problems speaking, understanding, or seeing.  · Feel confused.  · Cannot control when you pee (urinate) or poop (have a bowel movement).  · Lose feeling (have numbness) after an injury.  · Have new weakness in an arm or leg.  · Pass out (faint).  Summary  · Paresthesia is a burning or prickling feeling. It often happens in the hands, arms, legs, or feet.  · In most cases, the feeling goes away in a short time and is not a sign of a serious problem.  · If you have paresthesia that lasts a long time, you may need to be seen by your doctor.  This information is not intended to replace advice given to you by your health care provider. Make sure you discuss any questions you have with your health care provider.  Document Revised: 01/13/2020 Document Reviewed:  12/27/2018  Elsevier Patient Education © 2021 [employer]

## 2021-09-03 NOTE — ASSESSMENT & PLAN NOTE
Diabetes is newly identified.   Continue current treatment regimen.  Diabetes will be reassessed with an A1c level with labs today.

## 2021-09-04 RX ORDER — DAPAGLIFLOZIN 10 MG/1
10 TABLET, FILM COATED ORAL DAILY
Qty: 30 TABLET | Refills: 5 | Status: SHIPPED | OUTPATIENT
Start: 2021-09-04 | End: 2021-12-17 | Stop reason: SDUPTHER

## 2021-10-04 RX ORDER — IBUPROFEN 800 MG/1
TABLET ORAL
Qty: 90 TABLET | Refills: 0 | OUTPATIENT
Start: 2021-10-04

## 2021-10-04 NOTE — TELEPHONE ENCOUNTER
I refused the ibuprofen refill on her because it looks like urgent care has prescribed her diclofenac and is contraindicated for her to take both of those.  If she is no longer taking the diclofenac we can send her in the ibuprofen.

## 2021-10-05 RX ORDER — IBUPROFEN 800 MG/1
800 TABLET ORAL EVERY 8 HOURS PRN
Qty: 90 TABLET | Refills: 2 | Status: SHIPPED | OUTPATIENT
Start: 2021-10-05 | End: 2023-03-25

## 2021-10-05 NOTE — TELEPHONE ENCOUNTER
Patient states she is no longer taking the diclofenac. I went to refill the ibuprofen but it is no longer there. Can you please fill it?

## 2021-12-17 ENCOUNTER — OFFICE VISIT (OUTPATIENT)
Dept: FAMILY MEDICINE CLINIC | Facility: CLINIC | Age: 26
End: 2021-12-17

## 2021-12-17 VITALS
OXYGEN SATURATION: 99 % | WEIGHT: 265 LBS | DIASTOLIC BLOOD PRESSURE: 88 MMHG | SYSTOLIC BLOOD PRESSURE: 130 MMHG | BODY MASS INDEX: 44.15 KG/M2 | TEMPERATURE: 99.1 F | HEIGHT: 65 IN | HEART RATE: 89 BPM

## 2021-12-17 DIAGNOSIS — L05.01 PILONIDAL CYST WITH ABSCESS: Primary | ICD-10-CM

## 2021-12-17 DIAGNOSIS — E11.9 TYPE 2 DIABETES MELLITUS WITHOUT COMPLICATION, WITHOUT LONG-TERM CURRENT USE OF INSULIN (HCC): ICD-10-CM

## 2021-12-17 PROCEDURE — 99214 OFFICE O/P EST MOD 30 MIN: CPT | Performed by: NURSE PRACTITIONER

## 2021-12-17 RX ORDER — DAPAGLIFLOZIN 10 MG/1
10 TABLET, FILM COATED ORAL DAILY
Qty: 30 TABLET | Refills: 5 | Status: SHIPPED | OUTPATIENT
Start: 2021-12-17 | End: 2022-05-16 | Stop reason: SDUPTHER

## 2021-12-17 NOTE — PATIENT INSTRUCTIONS
Pilonidal Cyst    A pilonidal cyst is a fluid-filled sac that forms beneath the skin near the tailbone, at the top of the crease of the buttocks (pilonidal area). If the cyst is not large and not infected, it may not cause any problems.  If the cyst becomes irritated or infected, it may get larger and fill with pus. An infected cyst is called an abscess. A pilonidal abscess may cause pain and swelling, and it may need to be drained or removed.  What are the causes?  The cause of this condition is not always known. In some cases, a hair that grows into your skin (ingrown hair) may be the cause.  What increases the risk?  You are more likely to get a pilonidal cyst if you:  · Are male.  · Have lots of hair near the crease of the buttocks.  · Are overweight.  · Have a dimple near the crease of the buttocks.  · Wear tight clothing.  · Do not bathe or shower often.  · Sit for long periods of time.  What are the signs or symptoms?  Signs and symptoms of a pilonidal cyst may include pain, swelling, redness, and warmth in the pilonidal area. Depending on how big the cyst is, you may be able to feel a lump near your tailbone. If your cyst becomes infected, symptoms may include:  · Pus or fluid drainage.  · Fever.  · Pain, swelling, and redness getting worse.  · The lump getting bigger.  How is this diagnosed?  This condition may be diagnosed based on:  · Your symptoms and medical history.  · A physical exam.  · A blood test to check for infection.  · Testing a pus sample, if applicable.  How is this treated?  If your cyst does not cause symptoms, you may not need any treatment. If your cyst bothers you or is infected, you may need a procedure to drain or remove the cyst. Depending on the size, location, and severity of your cyst, your health care provider may:  · Make an incision in the cyst and drain it (incision and drainage).  · Open and drain the cyst, and then stitch the wound so that it stays open while it heals  (marsupialization). You will be given instructions about how to care for your open wound while it heals.  · Remove all or part of the cyst, and then close the wound (cyst removal).  You may need to take antibiotic medicines before your procedure.  Follow these instructions at home:  Medicines  · Take over-the-counter and prescription medicines only as told by your health care provider.  · If you were prescribed an antibiotic medicine, take it as told by your health care provider. Do not stop taking the antibiotic even if you start to feel better.  General instructions  · Keep the area around your pilonidal cyst clean and dry.  · If there is fluid or pus draining from your cyst:  ? Cover the area with a clean bandage (dressing) as needed.  ? Wash the area gently with soap and water. Pat the area dry with a clean towel. Do not rub the area because that may cause bleeding.  · Remove hair from the area around the cyst only if your health care provider tells you to do this.  · Do not wear tight pants or sit in one position for long periods at a time.  · Keep all follow-up visits as told by your health care provider. This is important.  Contact a health care provider if you have:  · New redness, swelling, or pain.  · A fever.  · Severe pain.  Summary  · A pilonidal cyst is a fluid-filled sac that forms beneath the skin near the tailbone, at the top of the crease of the buttocks (pilonidal area).  · If the cyst becomes irritated or infected, it may get larger and fill with pus. An infected cyst is called an abscess.  · The cause of this condition is not always known. In some cases, a hair that grows into your skin (ingrown hair) may be the cause.  · If your cyst does not cause symptoms, you may not need any treatment. If your cyst bothers you or is infected, you may need a procedure to drain or remove the cyst.  This information is not intended to replace advice given to you by your health care provider. Make sure you  discuss any questions you have with your health care provider.  Document Revised: 12/06/2018 Document Reviewed: 12/06/2018  Elsevier Patient Education © 2021 [employer]

## 2021-12-17 NOTE — ASSESSMENT & PLAN NOTE
I will get her referred to general surgery.  Advised that if any worsening of her symptoms over the weekend to go to the emergency room.

## 2021-12-17 NOTE — PROGRESS NOTES
"Chief Complaint  Hospital Follow Up Visit (er)    Subjective          Analisa Mell Isaac presents to [facility]  History of Present Illness   She presents today for follow-up from the urgent care on 12/13/2021.  She has been diagnosed with a pilonidal cyst.  She was started on Bactrim.    Diabetes type 2, non-insulin-dependent: She is complaining that Metformin is causing her diarrhea.  She is also on Farxiga.  Her last A1c was 7.03% 3 months ago.  She was supposed to followed up last month but missed her appointment.  Objective   Vital Signs:   /88   Pulse 89   Temp 99.1 °F (37.3 °C)   Ht 165.1 cm (65\")   Wt 120 kg (265 lb)   SpO2 99%   BMI 44.10 kg/m²     Physical Exam  Vitals reviewed.   Constitutional:       Appearance: Normal appearance. She is well-developed.   Neck:      Thyroid: No thyroid mass, thyromegaly or thyroid tenderness.   Cardiovascular:      Rate and Rhythm: Normal rate and regular rhythm.      Heart sounds: No murmur heard.  No friction rub. No gallop.    Pulmonary:      Effort: Pulmonary effort is normal.      Breath sounds: Normal breath sounds. No wheezing or rhonchi.   Lymphadenopathy:      Cervical: No cervical adenopathy.   Skin:     General: Skin is warm and dry.          Neurological:      Mental Status: She is alert and oriented to person, place, and time.      Cranial Nerves: No cranial nerve deficit.   Psychiatric:         Mood and Affect: Mood and affect normal.         Behavior: Behavior normal.         Thought Content: Thought content normal. Thought content does not include homicidal or suicidal ideation.         Judgment: Judgment normal.        Result Review :                 Assessment and Plan    Diagnoses and all orders for this visit:    1. Pilonidal cyst with abscess (Primary)  Assessment & Plan:  I will get her referred to general surgery.  Advised that if any worsening of her symptoms over the weekend to go to the " emergency room.    Orders:  -     Ambulatory Referral to General Surgery    2. Type 2 diabetes mellitus without complication, without long-term current use of insulin ([facility])  Assessment & Plan:  Patient not tolerating Metformin, will discontinue and start her on Januvia.  She will continue Farxiga.  Patient will return for fasting labs next week and will follow up with me in 2 weeks.    Orders:  -     Hemoglobin A1c; Future  -     Comprehensive Metabolic Panel; Future  -     Lipid Panel; Future  -     CBC Auto Differential; Future    Other orders  -     SITagliptin (Januvia) 50 MG tablet; Take 1 tablet by mouth Daily.  Dispense: 30 tablet; Refill: 0  -     Dapagliflozin Propanediol (Farxiga) 10 MG tablet; Take 10 mg by mouth Daily.  Dispense: 30 tablet; Refill: 5      Follow Up   Return in about 2 weeks (around 12/31/2021) for Next scheduled follow up DIABETES.  Patient was given instructions and counseling regarding her condition or for health maintenance advice. Please see specific information pulled into the AVS if appropriate.

## 2021-12-17 NOTE — ASSESSMENT & PLAN NOTE
Patient not tolerating Metformin, will discontinue and start her on Januvia.  She will continue Farxiga.  Patient will return for fasting labs next week and will follow up with me in 2 weeks.

## 2021-12-20 ENCOUNTER — PATIENT MESSAGE (OUTPATIENT)
Dept: FAMILY MEDICINE CLINIC | Facility: CLINIC | Age: 26
End: 2021-12-20

## 2021-12-20 ENCOUNTER — OFFICE VISIT (OUTPATIENT)
Dept: SURGERY | Facility: CLINIC | Age: 26
End: 2021-12-20

## 2021-12-20 VITALS — HEIGHT: 65 IN | RESPIRATION RATE: 16 BRPM | BODY MASS INDEX: 43.32 KG/M2 | WEIGHT: 260 LBS

## 2021-12-20 DIAGNOSIS — L05.01 PILONIDAL ABSCESS: Primary | ICD-10-CM

## 2021-12-20 PROCEDURE — 99202 OFFICE O/P NEW SF 15 MIN: CPT | Performed by: SURGERY

## 2021-12-20 RX ORDER — BLOOD-GLUCOSE METER
EACH MISCELLANEOUS
COMMUNITY
Start: 2021-12-17

## 2021-12-20 RX ORDER — LANCETS
1 EACH MISCELLANEOUS DAILY
COMMUNITY
Start: 2021-12-17 | End: 2022-05-16 | Stop reason: SDUPTHER

## 2021-12-20 RX ORDER — BLOOD SUGAR DIAGNOSTIC
1 STRIP MISCELLANEOUS DAILY
COMMUNITY
Start: 2021-12-17 | End: 2022-05-16 | Stop reason: SDUPTHER

## 2021-12-20 NOTE — PROGRESS NOTES
Chief Complaint:  No chief complaint on file.    Primary Care Provider: Lena Kelly APRN    Referring Provider: Lena Kelly A*    History of Present Illness  Analisa Isaac is a 26 y.o. female referred by TYLER Riggs* for evaluation for an area at her upper buttock that was very painful and swollen last week.  She was placed on oral antibiotics and is finishing the oral antibiotics now.  Toward the end of last week the area started draining fluid and now feels completely fine and the patient has no pain at all.  Patient has had pain on and off at her buttock but she has never had an area that popped open to drain fluid.    Allergies: Latex, natural rubber and Metformin    No outpatient medications have been marked as taking for the 12/20/21 encounter (Appointment) with Cam Rutherford MD.       Past Medical History:   • Asthma   • Diabetes mellitus (HCC)   • Left knee pain        No past surgical history on file.    Family History:   Family History   Problem Relation Age of Onset   • Breast cancer Maternal Aunt 40   • Diabetes Other         Social History:  Social History     Tobacco Use   • Smoking status: Never Smoker   • Smokeless tobacco: Never Used   Substance Use Topics   • Alcohol use: Yes     Comment: current some day        Objective     Vital Signs:  There were no vitals taken for this visit.  • Constitutional: here alone, alert, no acute distress, reliable historian  • HENT:  NCAT, no visible deformities or lesions  • Eyes:  sclerae clear, conjunctivae clear, EOMI  • Neck:  normal appearance, no masses, trachea midline  • Respiratory:  breathing not labored, respiratory effort appears normal  • Cardiovascular:  heart regular rate  • Abdomen:  nondistended    • Skin and subcutaneous tissue: At the superior aspect of the midline gluteal cleft just to the left side, there is an area about 2 cm in diameter where the skin and soft tissue is indurated but there is no  fluctuance, tenderness, or erythema.  No drainage.  • Musculoskeletal: moving all extremities symmetrically and purposefully  • Neurologic:  no obvious motor or sensory deficits, normal gait, able to stand without difficulty, cerebellar function without any obvious abnormalities, alert & oriented x 3, speech clear  • Psychiatric:  judgment and insight intact, mood normal, affect appropriate, cooperative    Assessment:  Pilonidal abscess - no acute issue that needs addressed today - any abscess that was present prior to seeing me today has resolved on its own    Plan:  Complete oral antibiotic (Bactrim) already prescribed  Sitz baths  See me again if area of concern doesn't continue to improve on its own    Cam Rutherford MD  12/20/2021    Electronically signed by Cam Rutherford MD, 12/20/21, 1:55 PM EST.

## 2021-12-21 NOTE — TELEPHONE ENCOUNTER
From: Analisa Isaac  To: PERLA Riggs  Sent: 12/20/2021 2:01 PM EST  Subject: Bloodwork    Hello, I was wondering for the bloodwork do I need to make an appointment or can I just walk in and get it done.

## 2021-12-23 ENCOUNTER — LAB (OUTPATIENT)
Dept: FAMILY MEDICINE CLINIC | Facility: CLINIC | Age: 26
End: 2021-12-23

## 2021-12-23 DIAGNOSIS — E11.9 TYPE 2 DIABETES MELLITUS WITHOUT COMPLICATION, WITHOUT LONG-TERM CURRENT USE OF INSULIN (HCC): ICD-10-CM

## 2021-12-23 LAB
ALBUMIN SERPL-MCNC: 5.1 G/DL (ref 3.5–5.2)
ALBUMIN/GLOB SERPL: 1.8 G/DL
ALP SERPL-CCNC: 63 U/L (ref 39–117)
ALT SERPL W P-5'-P-CCNC: 45 U/L (ref 1–33)
ANION GAP SERPL CALCULATED.3IONS-SCNC: 10.2 MMOL/L (ref 5–15)
AST SERPL-CCNC: 33 U/L (ref 1–32)
BASOPHILS # BLD AUTO: 0.03 10*3/MM3 (ref 0–0.2)
BASOPHILS NFR BLD AUTO: 0.5 % (ref 0–1.5)
BILIRUB SERPL-MCNC: 0.3 MG/DL (ref 0–1.2)
BUN SERPL-MCNC: 10 MG/DL (ref 6–20)
BUN/CREAT SERPL: 15.4 (ref 7–25)
CALCIUM SPEC-SCNC: 9.7 MG/DL (ref 8.6–10.5)
CHLORIDE SERPL-SCNC: 101 MMOL/L (ref 98–107)
CHOLEST SERPL-MCNC: 161 MG/DL (ref 0–200)
CO2 SERPL-SCNC: 24.8 MMOL/L (ref 22–29)
CREAT SERPL-MCNC: 0.65 MG/DL (ref 0.57–1)
DEPRECATED RDW RBC AUTO: 37.7 FL (ref 37–54)
EOSINOPHIL # BLD AUTO: 0.18 10*3/MM3 (ref 0–0.4)
EOSINOPHIL NFR BLD AUTO: 2.9 % (ref 0.3–6.2)
ERYTHROCYTE [DISTWIDTH] IN BLOOD BY AUTOMATED COUNT: 12 % (ref 12.3–15.4)
GFR SERPL CREATININE-BSD FRML MDRD: 133 ML/MIN/1.73
GLOBULIN UR ELPH-MCNC: 2.8 GM/DL
GLUCOSE SERPL-MCNC: 128 MG/DL (ref 65–99)
HBA1C MFR BLD: 7.53 % (ref 4.8–5.6)
HCT VFR BLD AUTO: 40.5 % (ref 34–46.6)
HDLC SERPL-MCNC: 32 MG/DL (ref 40–60)
HGB BLD-MCNC: 13.6 G/DL (ref 12–15.9)
IMM GRANULOCYTES # BLD AUTO: 0.04 10*3/MM3 (ref 0–0.05)
IMM GRANULOCYTES NFR BLD AUTO: 0.6 % (ref 0–0.5)
LDLC SERPL CALC-MCNC: 111 MG/DL (ref 0–100)
LDLC/HDLC SERPL: 3.42 {RATIO}
LYMPHOCYTES # BLD AUTO: 2.23 10*3/MM3 (ref 0.7–3.1)
LYMPHOCYTES NFR BLD AUTO: 35.3 % (ref 19.6–45.3)
MCH RBC QN AUTO: 28.9 PG (ref 26.6–33)
MCHC RBC AUTO-ENTMCNC: 33.6 G/DL (ref 31.5–35.7)
MCV RBC AUTO: 86 FL (ref 79–97)
MONOCYTES # BLD AUTO: 0.4 10*3/MM3 (ref 0.1–0.9)
MONOCYTES NFR BLD AUTO: 6.3 % (ref 5–12)
NEUTROPHILS NFR BLD AUTO: 3.43 10*3/MM3 (ref 1.7–7)
NEUTROPHILS NFR BLD AUTO: 54.4 % (ref 42.7–76)
NRBC BLD AUTO-RTO: 0 /100 WBC (ref 0–0.2)
PLATELET # BLD AUTO: 471 10*3/MM3 (ref 140–450)
PMV BLD AUTO: 10.4 FL (ref 6–12)
POTASSIUM SERPL-SCNC: 4.1 MMOL/L (ref 3.5–5.2)
PROT SERPL-MCNC: 7.9 G/DL (ref 6–8.5)
RBC # BLD AUTO: 4.71 10*6/MM3 (ref 3.77–5.28)
SODIUM SERPL-SCNC: 136 MMOL/L (ref 136–145)
TRIGL SERPL-MCNC: 98 MG/DL (ref 0–150)
VLDLC SERPL-MCNC: 18 MG/DL (ref 5–40)
WBC NRBC COR # BLD: 6.31 10*3/MM3 (ref 3.4–10.8)

## 2021-12-23 PROCEDURE — 83036 HEMOGLOBIN GLYCOSYLATED A1C: CPT | Performed by: NURSE PRACTITIONER

## 2021-12-23 PROCEDURE — 80053 COMPREHEN METABOLIC PANEL: CPT | Performed by: NURSE PRACTITIONER

## 2021-12-23 PROCEDURE — 80061 LIPID PANEL: CPT | Performed by: NURSE PRACTITIONER

## 2021-12-23 PROCEDURE — 85025 COMPLETE CBC W/AUTO DIFF WBC: CPT | Performed by: NURSE PRACTITIONER

## 2021-12-27 ENCOUNTER — TELEPHONE (OUTPATIENT)
Dept: FAMILY MEDICINE CLINIC | Facility: CLINIC | Age: 26
End: 2021-12-27

## 2021-12-27 DIAGNOSIS — E11.9 TYPE 2 DIABETES MELLITUS WITHOUT COMPLICATION, WITHOUT LONG-TERM CURRENT USE OF INSULIN (HCC): Primary | ICD-10-CM

## 2022-01-03 DIAGNOSIS — E11.9 TYPE 2 DIABETES MELLITUS WITHOUT COMPLICATION, WITHOUT LONG-TERM CURRENT USE OF INSULIN: Primary | ICD-10-CM

## 2022-01-12 ENCOUNTER — EDUCATION (OUTPATIENT)
Dept: DIABETES SERVICES | Facility: HOSPITAL | Age: 27
End: 2022-01-12

## 2022-01-12 DIAGNOSIS — IMO0002 DIABETES MELLITUS TYPE 2, UNCONTROLLED, WITH COMPLICATIONS: Primary | ICD-10-CM

## 2022-01-12 PROCEDURE — G0108 DIAB MANAGE TRN  PER INDIV: HCPCS

## 2022-01-12 NOTE — PROGRESS NOTES
Initial Visit and Education Plan:  Amanda Isaac 26 y.o. presented to [facility] for initial Diabetes Self Management Education.  Patient states the reason for their visit is to learn about diabetes. Patient is referred by Lena Kelly A*.     Ht: 65 inches  Wt: 261 punds    Allergies   Allergen Reactions    Latex, Natural Rubber Swelling    Metformin Diarrhea        LABS:  Lab Results (most recent)       Results for AMANDA ISAAC (MRN [id number]) as of 1/17/2022 08:57   Ref. Range 10/30/2020 16:23 5/1/2021 14:58 6/7/2021 13:22 9/3/2021 15:23 12/23/2021 08:22   Glucose Latest Ref Range: 65 - 99 mg/dL   126 (H) 88 128 (H)   Sodium Latest Ref Range: 136 - 145 mmol/L   140 138 136   Potassium Latest Ref Range: 3.5 - 5.2 mmol/L   4.6 4.4 4.1   CO2 Latest Ref Range: 22.0 - 29.0 mmol/L   29.9 (H) 28.7 24.8   Chloride Latest Ref Range: 98 - 107 mmol/L   102 100 101   Anion Gap Latest Ref Range: 5.0 - 15.0 mmol/L   8.1 9.3 10.2   Creatinine Latest Ref Range: 0.57 - 1.00 mg/dL   0.54 (L) 0.61 0.65   BUN Latest Ref Range: 6 - 20 mg/dL   9 9 10   BUN/Creatinine Ratio Latest Ref Range: 7.0 - 25.0    16.7 14.8 15.4   Calcium Latest Ref Range: 8.6 - 10.5 mg/dL   9.8 9.9 9.7   eGFR African Am Latest Ref Range: >60 mL/min/1.73   >150 144 133   Alkaline Phosphatase Latest Ref Range: 39 - 117 U/L   64 70 63   Total Protein Latest Ref Range: 6.0 - 8.5 g/dL   7.5 8.0 7.9   ALT (SGPT) Latest Ref Range: 1 - 33 U/L   39 (H) 41 (H) 45 (H)   AST (SGOT) Latest Ref Range: 1 - 32 U/L   30 24 33 (H)   Total Bilirubin Latest Ref Range: 0.0 - 1.2 mg/dL   0.4 0.3 0.3   Albumin Latest Ref Range: 3.50 - 5.20 g/dL   4.90 5.10 5.10   Globulin Latest Units: gm/dL   2.6 2.9 2.8   A/G Ratio Latest Units: g/dL   1.9 1.8 1.8   Hemoglobin A1C Latest Ref Range: 4.80 - 5.60 %   6.60 (H) 7.03 (H) 7.53 (H)   TSH Baseline Latest Ref Range: 0.270 - 4.200 uIU/mL   1.520     Folate Latest Ref Range: 4.78 -  24.20 ng/mL    9.02    Total Cholesterol Latest Ref Range: 0 - 200 mg/dL   184  161   HDL Cholesterol Latest Ref Range: 40 - 60 mg/dL   30 (L)  32 (L)   LDL Cholesterol  Latest Ref Range: 0 - 100 mg/dL   131 (H)  111 (H)   VLDL Cholesterol Latest Ref Range: 5 - 40 mg/dL   23  18   Triglycerides Latest Ref Range: 0 - 150 mg/dL   124  98   LDL/HDL Ratio Unknown   4.31  3.42   Vitamin B-12 Latest Ref Range: 211 - 946 pg/mL    496               Labs reviewed with patient.    Past Medical History:   Diagnosis Date    Asthma 10/19/2020    Diabetes mellitus (HCC)     Left knee pain 10/19/2020        No past surgical history on file.     Social History     Tobacco Use    Smoking status: Never Smoker    Smokeless tobacco: Never Used   Vaping Use    Vaping Use: Never used   Substance Use Topics    Alcohol use: Yes     Comment: current some day     Drug use: Never        Family History   Problem Relation Age of Onset    Breast cancer Maternal Aunt 40    Diabetes Other         Education Plan as follows:      Blood Glucose Monitoring Instructions and Plan:   We reviewed blood glucose testing and ADA recommended blood glucose level for fasting, pre and post meals. Patient demonstrates understanding and importance of testing blood glucose 1  time a day and if have symptoms of low or high blood glucose. Patient will log BG results. Patient was given written material including blood glucose log.     Initial Nutrition Instructions and Plan:   Patient was instructed and demonstrated reading a food label. Serving size and carbohydrate amounts were emphasized.   45 carbs per meal 3 meals a day  15-20 carbs per snacks 3 snacks per day.   Patient has not formally seen a dietitian. Patient was given written materials including Nutrition in the [employer].   My Fitness Pal Raj explained and demonstrated to patient.     Medication Instructions and Plan:     Current Outpatient Medications:     Accu-Chek FastClix Lancets misc, 1 each by Other  route Daily., Disp: , Rfl:     Accu-Chek Guide test strip, 1 each by Other route Daily. use to check blood sugar every day, Disp: , Rfl:     albuterol sulfate  (90 Base) MCG/ACT inhaler, Inhale 2 puffs Every 4 (Four) Hours As Needed., Disp: , Rfl:     Blood Glucose Monitoring Suppl (Accu-Chek Guide) w/Device kit, USE AS DIRECTED TO CHECK BLOOD SUGAR EVERY DAY, Disp: , Rfl:     cyclobenzaprine (FLEXERIL) 10 MG tablet, Take 1 tablet by mouth At Night As Needed for Muscle Spasms., Disp: 30 tablet, Rfl: 0    Dapagliflozin Propanediol (Farxiga) 10 MG tablet, Take 10 mg by mouth Daily., Disp: 30 tablet, Rfl: 5    ibuprofen (ADVIL,MOTRIN) 800 MG tablet, Take 1 tablet by mouth Every 8 (Eight) Hours As Needed for Moderate Pain ., Disp: 90 tablet, Rfl: 2    SITagliptin (Januvia) 50 MG tablet, Take 1 tablet by mouth Daily., Disp: 30 tablet, Rfl: 0   Medication list was reviewed with patient. Patient denies questions or concerns regarding current medication therapy. Patient was instructed to continue medications as prescribed.     Exercise:   Patient has been instructed to walk for 10 minutes after each meal initially and build strength and endurance to achieve 30 minutes of sustained exercise per day; recommended patient seek advice from Primary Care Provider prior to beginning any exercise program if other health concerns exist.     Problem solving and reducing risks:   I explained the importance of keeping provider appointments, taking medications as prescribed, having laboratory work performed as ordered by provider and seeking care as soon as possible when complications occur.     Patient Selected Behavioral Goal :  #1 - Test blood glucose one time a day  #2 - carb count every day    Patient Selected Ongoing Support Plan:  Mobile Apps, Family Member Support and Friend Support          Follow up Instructions and Plan: Patient is scheduled with Hope SLAUGHTER Patient will be contact when group classes resume.  DSME staff will contact patient at 3 months and 6 months to evaluate patient's further needs regarding diabetes.        Start Time: 8:15  End Time: 9:24    Julianna Holcomb RN, BSN  01/12/2022

## 2022-01-13 RX ORDER — SITAGLIPTIN 50 MG/1
TABLET, FILM COATED ORAL
Qty: 30 TABLET | Refills: 0 | Status: SHIPPED | OUTPATIENT
Start: 2022-01-13 | End: 2022-03-02

## 2022-01-14 ENCOUNTER — HOSPITAL ENCOUNTER (OUTPATIENT)
Dept: NUTRITION | Facility: HOSPITAL | Age: 27
Discharge: HOME | End: 2022-01-14
Admitting: DIETITIAN, REGISTERED

## 2022-01-14 PROCEDURE — 97802 MEDICAL NUTRITION INDIV IN: CPT | Performed by: DIETITIAN, REGISTERED

## 2022-01-17 VITALS — HEIGHT: 65 IN | BODY MASS INDEX: 42.79 KG/M2 | WEIGHT: 256.84 LBS

## 2022-01-17 NOTE — CONSULTS
"Analisa Isaac is a 26 y.o. female who presents to [facility] for nutrition consult r/t diagnosis of T2DM.  Pt states she was diagnosed in June 2021 but did not take it seriously.  She is ready to make lifestyle changes for better glucose control.  Analisa Isaac is referred by Lena Kelly A*.     Past Medical History:   Diagnosis Date   • Asthma 10/19/2020   • Diabetes mellitus (HCC)    • Left knee pain 10/19/2020       Anthropometrics  Ht Readings from Last 1 Encounters:   01/14/22 165.1 cm (65\")     Wt Readings from Last 1 Encounters:   01/14/22 116 kg (256 lb 13.4 oz)     Body mass index is 42.74 kg/m².    Diabetes History  Diabetes History  What type of diabetes do you have?: Type 2  Length of Diabetes Diagnosis: 6 -12 months  Current DM knowledge: good  Have you had diabetes education/teaching in the past?: yes  Do you test your blood sugar at home?: yes  Frequency of checks: not daily  Who performs the test?: self  Typical readings: various times of the day; pt states readings 110-120; if her BG is under 100 she feels week, if BG over 130 she feels swollen    Education Preferences  Education Preferences  What areas of diabetes would you like to learn about?: diet information    Nutrition Information  Nutrition Information  Enter everything you can remember eating in the last 24 hours (1 day): breakfast- cereal (Cheerios or Raisin Bran); lunch- chicken salad sandwich, leftovers from home; dinner- enchilada bowl w/ chicken and cauliflower rice; snacks- fruit; beverages- water, Powerade or Gatorade Zero, sips of soda only if BG is low  How many meals do you eat each day?: 3  How many snacks do you eat each day?: 1  What is the biggest challenge you have with your diet?: Knowledge    Education Needs  DM Education Needs  Medication: Oral  Healthy Eating: RD consult, Reviewed meal plan, Basic meal plan provided  Physical Activity Frequency: " Rarely  Motivation: Engaged  Teaching Method: Explanation, Discussion, Handouts  Patient Response: Verbalized understanding    DM Goals  DM Goals  Healthy Eating - Goal: Today  Being Active - Goal: Today  Taking Medication - Goal: Today  Monitoring - Goal: Today    Medications  Current Outpatient Medications   Medication Sig Dispense Refill   • Accu-Chek FastClix Lancets misc 1 each by Other route Daily.     • Accu-Chek Guide test strip 1 each by Other route Daily. use to check blood sugar every day     • albuterol sulfate  (90 Base) MCG/ACT inhaler Inhale 2 puffs Every 4 (Four) Hours As Needed.     • Blood Glucose Monitoring Suppl (Accu-Chek Guide) w/Device kit USE AS DIRECTED TO CHECK BLOOD SUGAR EVERY DAY     • cyclobenzaprine (FLEXERIL) 10 MG tablet Take 1 tablet by mouth At Night As Needed for Muscle Spasms. 30 tablet 0   • Dapagliflozin Propanediol (Farxiga) 10 MG tablet Take 10 mg by mouth Daily. 30 tablet 5   • ibuprofen (ADVIL,MOTRIN) 800 MG tablet Take 1 tablet by mouth Every 8 (Eight) Hours As Needed for Moderate Pain . 90 tablet 2   • Januvia 50 MG tablet TAKE 1 TABLET BY MOUTH DAILY 30 tablet 0     No current facility-administered medications for this encounter.       Labs   Lab Results   Component Value Date    HGBA1C 7.53 (H) 12/23/2021       Nutrition counseling provided on carbohydrate counting, portion control, measuring and reading labels. Discussed eating out and gave suggestions on controlling carbohydrate intake and making healthier food choices.     Meal Plan:   Total Carbohydrates per meal: 2-3 carb servings/meal, at least 3 meals/day  Lean protein with meals.  Limit added fats.  Snacks: 1 carbohydrate serving (</= 22 g) + 1 protein serving.     Daily exercise encouraged (as recommended by healthcare provider). Discussed the benefits of exercise in lowering blood glucose, blood pressure, cholesterol, stress and controlling body weight.     Advised to continue daily blood glucose  monitoring to assist with understanding of factors affecting blood glucose and assist with management of diabetes.  Discussed and provided with target BG ranges.     Literature provided: Diabetes Nutrition Placemat, Choose Your Foods Booklet    Dietitian contact number provided.  Patient encouraged to call with questions or concerns.     Time spent with patient: 30 minutes    Copy of diabetes MNT visit note sent to Lena Kelly A*.    Hope Horvath RDN, LD

## 2022-01-26 ENCOUNTER — PATIENT MESSAGE (OUTPATIENT)
Dept: FAMILY MEDICINE CLINIC | Facility: CLINIC | Age: 27
End: 2022-01-26

## 2022-01-26 NOTE — TELEPHONE ENCOUNTER
From: Analisa Isaac  To: PERLA Riggs  Sent: 1/26/2022 7:35 AM EST  Subject: Appointment     I have a question, what is this appointment for? Like is it to just go over my bloodwork or is there more.

## 2022-01-28 ENCOUNTER — OFFICE VISIT (OUTPATIENT)
Dept: FAMILY MEDICINE CLINIC | Facility: CLINIC | Age: 27
End: 2022-01-28

## 2022-01-28 VITALS
BODY MASS INDEX: 43.02 KG/M2 | SYSTOLIC BLOOD PRESSURE: 128 MMHG | HEIGHT: 65 IN | OXYGEN SATURATION: 96 % | DIASTOLIC BLOOD PRESSURE: 80 MMHG | WEIGHT: 258.2 LBS | HEART RATE: 91 BPM | TEMPERATURE: 99.2 F

## 2022-01-28 DIAGNOSIS — J45.20 MILD INTERMITTENT ASTHMA, UNSPECIFIED WHETHER COMPLICATED: ICD-10-CM

## 2022-01-28 DIAGNOSIS — E11.65 TYPE 2 DIABETES MELLITUS WITH HYPERGLYCEMIA, WITHOUT LONG-TERM CURRENT USE OF INSULIN: Primary | ICD-10-CM

## 2022-01-28 LAB
ALBUMIN UR-MCNC: <1.2 MG/DL
CREAT UR-MCNC: 130.1 MG/DL
MICROALBUMIN/CREAT UR: NORMAL MG/G{CREAT}

## 2022-01-28 PROCEDURE — 99214 OFFICE O/P EST MOD 30 MIN: CPT | Performed by: NURSE PRACTITIONER

## 2022-01-28 PROCEDURE — 82570 ASSAY OF URINE CREATININE: CPT | Performed by: NURSE PRACTITIONER

## 2022-01-28 PROCEDURE — 82043 UR ALBUMIN QUANTITATIVE: CPT | Performed by: NURSE PRACTITIONER

## 2022-01-28 NOTE — PROGRESS NOTES
"Chief Complaint  2 week follow up and Diabetes    Subjective          DesMonique Mell Isaac presents to [facility]  History of Present Illness   26-year-old female presents today for follow-up on diabetes.  She had her labs drawn last month.    Diabetes type 2, non-insulin-dependent: Her A1c increased to 7.5%, was previously 7%.  She is on Farxiga 10 mg daily.  She states she recently spoke to the nutritionist and she is working on a better diet and exercising.    Asthma: She states her asthma is well controlled, only uses her albuterol inhaler as needed.  Objective   Vital Signs:   /80   Pulse 91   Temp 99.2 °F (37.3 °C)   Ht 165.1 cm (65\")   Wt 117 kg (258 lb 3.2 oz)   SpO2 96%   BMI 42.97 kg/m²     Physical Exam  Vitals reviewed.   Constitutional:       Appearance: Normal appearance. She is well-developed.   Neck:      Thyroid: No thyroid mass, thyromegaly or thyroid tenderness.   Cardiovascular:      Rate and Rhythm: Normal rate and regular rhythm.      Heart sounds: No murmur heard.  No friction rub. No gallop.    Pulmonary:      Effort: Pulmonary effort is normal.      Breath sounds: Normal breath sounds. No wheezing or rhonchi.   Lymphadenopathy:      Cervical: No cervical adenopathy.   Skin:     General: Skin is warm and dry.   Neurological:      Mental Status: She is alert and oriented to person, place, and time.      Cranial Nerves: No cranial nerve deficit.   Psychiatric:         Mood and Affect: Mood and affect normal.         Behavior: Behavior normal.         Thought Content: Thought content normal. Thought content does not include homicidal or suicidal ideation.         Judgment: Judgment normal.        Result Review :     CMP    CMP 6/7/21 9/3/21 12/23/21   Glucose 126 (A) 88 128 (A)   BUN 9 9 10   Creatinine 0.54 (A) 0.61 0.65   eGFR  Am >150 144 133   Sodium 140 138 136   Potassium 4.6 4.4 4.1   Chloride 102 100 101   Calcium 9.8 9.9 9.7 "   Albumin 4.90 5.10 5.10   Total Bilirubin 0.4 0.3 0.3   Alkaline Phosphatase 64 70 63   AST (SGOT) 30 24 33 (A)   ALT (SGPT) 39 (A) 41 (A) 45 (A)   (A) Abnormal value            A1C Last 3 Results    HGBA1C Last 3 Results 6/7/21 9/3/21 12/23/21   Hemoglobin A1C 6.60 (A) 7.03 (A) 7.53 (A)   (A) Abnormal value                      Assessment and Plan    Diagnoses and all orders for this visit:    1. Type 2 diabetes mellitus with hyperglycemia, without long-term current use of insulin (HCC) (Primary)  Assessment & Plan:  Diabetes is worsening.   Continue current treatment regimen.  Dietary recommendations for ADA diet.  Regular aerobic exercise.  Diabetes will be reassessed in 3 months.    Orders:  -     Microalbumin / Creatinine Urine Ratio - Urine, Clean Catch  -     Hemoglobin A1c; Future  -     Comprehensive Metabolic Panel; Future  -     Lipid Panel; Future  -     CBC Auto Differential; Future    2. Mild intermittent asthma, unspecified whether complicated  Assessment & Plan:  Asthma is improving with treatment.  The patient is experiencing no daytime asthma symptoms. She is experiencing no nighttime asthma symptoms.  Warning signs of respiratory distress were reviewed with the patient.   Follow up in 3 months, or sooner should new symptoms or problems arise.        Orders:  -     CBC Auto Differential; Future      Follow Up   Return in about 3 months (around 4/28/2022) for Next scheduled follow up diabetes.  Patient was given instructions and counseling regarding her condition or for health maintenance advice. Please see specific information pulled into the AVS if appropriate.

## 2022-01-28 NOTE — ASSESSMENT & PLAN NOTE
Asthma is improving with treatment.  The patient is experiencing no daytime asthma symptoms. She is experiencing no nighttime asthma symptoms.  Warning signs of respiratory distress were reviewed with the patient.   Follow up in 3 months, or sooner should new symptoms or problems arise.

## 2022-01-28 NOTE — PATIENT INSTRUCTIONS
Diabetes Mellitus and Nutrition, Adult  When you have diabetes, or diabetes mellitus, it is very important to have healthy eating habits because your blood sugar (glucose) levels are greatly affected by what you eat and drink. Eating healthy foods in the right amounts, at about the same times every day, can help you:  · Control your blood glucose.  · Lower your risk of heart disease.  · Improve your blood pressure.  · Reach or maintain a healthy weight.  What can affect my meal plan?  Every person with diabetes is different, and each person has different needs for a meal plan. Your health care provider may recommend that you work with a dietitian to make a meal plan that is best for you. Your meal plan may vary depending on factors such as:  · The calories you need.  · The medicines you take.  · Your weight.  · Your blood glucose, blood pressure, and cholesterol levels.  · Your activity level.  · Other health conditions you have, such as heart or kidney disease.  How do carbohydrates affect me?  Carbohydrates, also called carbs, affect your blood glucose level more than any other type of food. Eating carbs naturally raises the amount of glucose in your blood. Carb counting is a method for keeping track of how many carbs you eat. Counting carbs is important to keep your blood glucose at a healthy level, especially if you use insulin or take certain oral diabetes medicines.  It is important to know how many carbs you can safely have in each meal. This is different for every person. Your dietitian can help you calculate how many carbs you should have at each meal and for each snack.  How does alcohol affect me?  Alcohol can cause a sudden decrease in blood glucose (hypoglycemia), especially if you use insulin or take certain oral diabetes medicines. Hypoglycemia can be a life-threatening condition. Symptoms of hypoglycemia, such as sleepiness, dizziness, and confusion, are similar to symptoms of having too much  "alcohol.  · Do not drink alcohol if:  ? Your health care provider tells you not to drink.  ? You are pregnant, may be pregnant, or are planning to become pregnant.  · If you drink alcohol:  ? Do not drink on an empty stomach.  ? Limit how much you use to:  § 0-1 drink a day for women.  § 0-2 drinks a day for men.  ? Be aware of how much alcohol is in your drink. In the [place] one drink equals one 12 oz bottle of beer (355 mL), one 5 oz glass of wine (148 mL), or one 1½ oz glass of hard liquor (44 mL).  ? Keep yourself hydrated with water, diet soda, or unsweetened iced tea.  § Keep in mind that regular soda, juice, and other mixers may contain a lot of sugar and must be counted as carbs.  What are tips for following this plan?    Reading food labels  · Start by checking the serving size on the \"Nutrition Facts\" label of packaged foods and drinks. The amount of calories, carbs, fats, and other nutrients listed on the label is based on one serving of the item. Many items contain more than one serving per package.  · Check the total grams (g) of carbs in one serving. You can calculate the number of servings of carbs in one serving by dividing the total carbs by 15. For example, if a food has 30 g of total carbs per serving, it would be equal to 2 servings of carbs.  · Check the number of grams (g) of saturated fats and trans fats in one serving. Choose foods that have a low amount or none of these fats.  · Check the number of milligrams (mg) of salt (sodium) in one serving. Most people should limit total sodium intake to less than 2,300 mg per day.  · Always check the nutrition information of foods labeled as \"low-fat\" or \"nonfat.\" These foods may be higher in added sugar or refined carbs and should be avoided.  · Talk to your dietitian to identify your daily goals for nutrients listed on the label.  Shopping  · Avoid buying canned, pre-made, or processed foods. These foods tend to be high in fat, sodium, and added " sugar.  · Shop around the outside edge of the grocery store. This is where you will most often find fresh fruits and vegetables, bulk grains, fresh meats, and fresh dairy.  Cooking  · Use low-heat cooking methods, such as baking, instead of high-heat cooking methods like deep frying.  · Cook using healthy oils, such as olive, canola, or sunflower oil.  · Avoid cooking with butter, cream, or high-fat meats.  Meal planning  · Eat meals and snacks regularly, preferably at the same times every day. Avoid going long periods of time without eating.  · Eat foods that are high in fiber, such as fresh fruits, vegetables, beans, and whole grains. Talk with your dietitian about how many servings of carbs you can eat at each meal.  · Eat 4-6 oz (112-168 g) of lean protein each day, such as lean meat, chicken, fish, eggs, or tofu. One ounce (oz) of lean protein is equal to:  ? 1 oz (28 g) of meat, chicken, or fish.  ? 1 egg.  ? ¼ cup (62 g) of tofu.  · Eat some foods each day that contain healthy fats, such as avocado, nuts, seeds, and fish.  What foods should I eat?  Fruits  Berries. Apples. Oranges. Peaches. Apricots. Plums. Grapes. Saul. Papaya. Pomegranate. Kiwi. Cherries.  Vegetables  Lettuce. Spinach. Leafy greens, including kale, chard, grant greens, and mustard greens. Beets. Cauliflower. Cabbage. Broccoli. Carrots. Green beans. Tomatoes. Peppers. Onions. Cucumbers. [place] sprouts.  Grains  Whole grains, such as whole-wheat or whole-grain bread, crackers, tortillas, cereal, and pasta. Unsweetened oatmeal. Quinoa. Brown or wild rice.  Meats and other proteins  Seafood. Poultry without skin. Lean cuts of poultry and beef. Tofu. Nuts. Seeds.  Dairy  Low-fat or fat-free dairy products such as milk, yogurt, and cheese.  The items listed above may not be a complete list of foods and beverages you can eat. Contact a dietitian for more information.  What foods should I avoid?  Fruits  Fruits canned with  syrup.  Vegetables  Canned vegetables. Frozen vegetables with butter or cream sauce.  Grains  Refined white flour and flour products such as bread, pasta, snack foods, and cereals. Avoid all processed foods.  Meats and other proteins  Fatty cuts of meat. Poultry with skin. Breaded or fried meats. Processed meat. Avoid saturated fats.  Dairy  Full-fat yogurt, cheese, or milk.  Beverages  Sweetened drinks, such as soda or iced tea.  The items listed above may not be a complete list of foods and beverages you should avoid. Contact a dietitian for more information.  Questions to ask a health care provider  · Do I need to meet with a diabetes educator?  · Do I need to meet with a dietitian?  · What number can I call if I have questions?  · When are the best times to check my blood glucose?  Where to find more information:  · American Diabetes Association: diabetes.org  · Academy of Nutrition and Dietetics: www.eatright.org  · National [place] of Diabetes and Digestive and Kidney Diseases: www.niddk.nih.gov  · Association of Diabetes Care and Education Specialists: www.diabeteseducator.org  Summary  · It is important to have healthy eating habits because your blood sugar (glucose) levels are greatly affected by what you eat and drink.  · A healthy meal plan will help you control your blood glucose and maintain a healthy lifestyle.  · Your health care provider may recommend that you work with a dietitian to make a meal plan that is best for you.  · Keep in mind that carbohydrates (carbs) and alcohol have immediate effects on your blood glucose levels. It is important to count carbs and to use alcohol carefully.  This information is not intended to replace advice given to you by your health care provider. Make sure you discuss any questions you have with your health care provider.  Document Revised: 11/24/2020 Document Reviewed: 11/24/2020  [employer] Patient Education © 2021 [employer]      Diabetes Mellitus and  "Exercise  Exercising regularly is important for overall health, especially for people who have diabetes mellitus. Exercising is not only about losing weight. It has many other health benefits, such as increasing muscle strength and bone density and reducing body fat and stress. This leads to improved fitness, flexibility, and endurance, all of which result in better overall health.  What are the benefits of exercise if I have diabetes?  Exercise has many benefits for people with diabetes. They include:  · Helping to lower and control blood sugar (glucose).  · Helping the body to respond better to the hormone insulin by improving insulin sensitivity.  · Reducing how much insulin the body needs.  · Lowering the risk for heart disease by:  ? Lowering \"bad\" cholesterol and triglyceride levels.  ? Increasing \"good\" cholesterol levels.  ? Lowering blood pressure.  ? Lowering blood glucose levels.  What is my activity plan?  Your health care provider or certified diabetes educator can help you make a plan for the type and frequency of exercise that works for you. This is called your activity plan. Be sure to:  · Get at least 150 minutes of medium-intensity or high-intensity exercise each week. Exercises may include brisk walking, biking, or water aerobics.  · Do stretching and strengthening exercises, such as yoga or weight lifting, at least 2 times a week.  · Spread out your activity over at least 3 days of the week.  · Get some form of physical activity each day.  ? Do not go more than 2 days in a row without some kind of physical activity.  ? Avoid being inactive for more than 90 minutes at a time. Take frequent breaks to walk or stretch.  · Choose exercises or activities that you enjoy. Set realistic goals.  · Start slowly and gradually increase your exercise intensity over time.  How do I manage my diabetes during exercise?    Monitor your blood glucose  · Check your blood glucose before and after exercising. If your " blood glucose is:  ? 240 mg/dL (13.3 mmol/L) or higher before you exercise, check your urine for ketones. These are chemicals created by the liver. If you have ketones in your urine, do not exercise until your blood glucose returns to normal.  ? 100 mg/dL (5.6 mmol/L) or lower, eat a snack containing 15-20 grams of carbohydrate. Check your blood glucose 15 minutes after the snack to make sure that your glucose level is above 100 mg/dL (5.6 mmol/L) before you start your exercise.  · Know the symptoms of low blood glucose (hypoglycemia) and how to treat it. Your risk for hypoglycemia increases during and after exercise.  Follow these tips and your health care provider's instructions  · Keep a carbohydrate snack that is fast-acting for use before, during, and after exercise to help prevent or treat hypoglycemia.  · Avoid injecting insulin into areas of the body that are going to be exercised. For example, avoid injecting insulin into:  ? Your arms, when you are about to play tennis.  ? Your legs, when you are about to go jogging.  · Keep records of your exercise habits. Doing this can help you and your health care provider adjust your diabetes management plan as needed. Write down:  ? Food that you eat before and after you exercise.  ? Blood glucose levels before and after you exercise.  ? The type and amount of exercise you have done.  · Work with your health care provider when you start a new exercise or activity. He or she may need to:  ? Make sure that the activity is safe for you.  ? Adjust your insulin, other medicines, and food that you eat.  · Drink plenty of water while you exercise. This prevents loss of water (dehydration) and problems caused by a lot of heat in the body (heat stroke).  Where to find more information  · American Diabetes Association: www.diabetes.org  Summary  · Exercising regularly is important for overall health, especially for people who have diabetes mellitus.  · Exercising has many  health benefits. It increases muscle strength and bone density and reduces body fat and stress. It also lowers and controls blood glucose.  · Your health care provider or certified diabetes educator can help you make an activity plan for the type and frequency of exercise that works for you.  · Work with your health care provider to make sure any new activity is safe for you. Also work with your health care provider to adjust your insulin, other medicines, and the food you eat.  This information is not intended to replace advice given to you by your health care provider. Make sure you discuss any questions you have with your health care provider.  Document Revised: 09/14/2020 Document Reviewed: 09/14/2020  Elsevier Patient Education © 2021 [employer]

## 2022-02-21 ENCOUNTER — EDUCATION (OUTPATIENT)
Dept: DIABETES SERVICES | Facility: HOSPITAL | Age: 27
End: 2022-02-21

## 2022-02-21 VITALS — BODY MASS INDEX: 42.42 KG/M2 | HEIGHT: 65 IN | WEIGHT: 254.63 LBS

## 2022-02-21 DIAGNOSIS — IMO0002 DIABETES MELLITUS TYPE 2, UNCONTROLLED, WITH COMPLICATIONS: Primary | ICD-10-CM

## 2022-02-21 PROCEDURE — G0108 DIAB MANAGE TRN  PER INDIV: HCPCS

## 2022-02-21 NOTE — PROGRESS NOTES
Analisa Isaac 26 y.o. presents for follow-up visit.    Ht:65 inches    Wt: 113.4    LABS:  Lab Results (most recent)     None         Labs reviewed with patient.    Education Plan:       Blood Glucose Monitoring Instructions and Plan:   We reviewed blood glucose testing and ADA recommended blood glucose level for fasting, pre and post meals. Patient demonstrates understanding and importance of testing blood glucose 1-2 times a day; Patient will log BG results. Patient was given written material including blood glucose log.     Initial Nutrition Instructions and Plan:   Patient was instructed and demonstrated reading a food label. Serving size and carbohydrate amounts were emphasized. Patient stated she has been using plate method , decrease portion sizes  Patient has  formally seen a dietitian.   My Fitness Pal Raj explained and demonstrated to patient.     Medication Instructions and Plan:     Current Outpatient Medications:   •  Accu-Chek FastClix Lancets misc, 1 each by Other route Daily., Disp: , Rfl:   •  Accu-Chek Guide test strip, 1 each by Other route Daily. use to check blood sugar every day, Disp: , Rfl:   •  albuterol sulfate  (90 Base) MCG/ACT inhaler, Inhale 2 puffs Every 4 (Four) Hours As Needed., Disp: , Rfl:   •  Blood Glucose Monitoring Suppl (Accu-Chek Guide) w/Device kit, USE AS DIRECTED TO CHECK BLOOD SUGAR EVERY DAY, Disp: , Rfl:   •  cyclobenzaprine (FLEXERIL) 10 MG tablet, Take 1 tablet by mouth At Night As Needed for Muscle Spasms., Disp: 30 tablet, Rfl: 0  •  Dapagliflozin Propanediol (Farxiga) 10 MG tablet, Take 10 mg by mouth Daily., Disp: 30 tablet, Rfl: 5  •  ibuprofen (ADVIL,MOTRIN) 800 MG tablet, Take 1 tablet by mouth Every 8 (Eight) Hours As Needed for Moderate Pain ., Disp: 90 tablet, Rfl: 2  •  Januvia 50 MG tablet, TAKE 1 TABLET BY MOUTH DAILY, Disp: 30 tablet, Rfl: 0   Medication list was reviewed with patient. Patient denies questions or concerns regarding  current medication therapy. Patient was instructed to continue medications as prescribed.     Exercise:   Patient has been instructed to walk for 10 minutes after each meal initially and build strength and endurance to achieve 30 minutes of sustained exercise per day; recommended patient seek advice from Primary Care Provider prior to beginning any exercise program if other health concerns exist.     Problem solving and reducing risks:   I explained the importance of keeping provider appointments, taking medications as prescribed, having laboratory work performed as ordered by provider and seeking care as soon as possible when complications occur.     Goal follow-up   Continue to take blood glucose one time a day  Instead of ca\b count using plate method.    Follow up Instructions and Plan:Patient was encouraged to contact DSME staff with questions and or concerns.  DSME staff contact information was given to patient.  Patient will be contacted when group classes resume.   Patient is scheduled to return in 1 month for follow-up visit.    Other: Patient has lost 4 pounds in 1 month.    Start Time: 9:19  End Time: 9:49    Julianna Holcomb RN, BSN  02/21/2022

## 2022-03-02 RX ORDER — SITAGLIPTIN 50 MG/1
TABLET, FILM COATED ORAL
Qty: 30 TABLET | Refills: 2 | Status: SHIPPED | OUTPATIENT
Start: 2022-03-02 | End: 2022-05-16 | Stop reason: SDUPTHER

## 2022-03-04 RX ORDER — BLOOD-GLUCOSE METER
EACH MISCELLANEOUS
OUTPATIENT
Start: 2022-03-04

## 2022-03-07 ENCOUNTER — TELEPHONE (OUTPATIENT)
Dept: FAMILY MEDICINE CLINIC | Facility: CLINIC | Age: 27
End: 2022-03-07

## 2022-03-21 ENCOUNTER — EDUCATION (OUTPATIENT)
Dept: DIABETES SERVICES | Facility: HOSPITAL | Age: 27
End: 2022-03-21

## 2022-03-21 NOTE — PROGRESS NOTES
Patient presented for follow-up visit.  Patient states she has been experiencing a lot of stress in her life. Patient states she has not been testing her blood glucose and she has been drinking sweet drinks.  Her weight is 115.9 KG.  We discussed her testing her blood glucose once a day, 2 hours after she eats. We discussed her not consuming sweet drinks.

## 2022-04-11 ENCOUNTER — EDUCATION (OUTPATIENT)
Dept: DIABETES SERVICES | Facility: HOSPITAL | Age: 27
End: 2022-04-11

## 2022-04-11 DIAGNOSIS — E11.8 TYPE 2 DIABETES MELLITUS WITH UNSPECIFIED COMPLICATIONS: Primary | ICD-10-CM

## 2022-04-11 NOTE — PROGRESS NOTES
Analisa Isaac 26 y.o. presents for follow-up visit.    Ht: 65 inches    Wt: 114.5    LABS:  Lab Results (most recent)     No new labs since last visit.             Education Plan:     Blood Glucose Monitoring Instructions and Plan:   We reviewed blood glucose testing and ADA recommended blood glucose level for fasting, pre and post meals. Patient demonstrates understanding and importance of testing blood glucose 1-3 times a day; Patient will log BG results. Patient was given written material including blood glucose log.     Initial Nutrition Instructions and Plan:   Patient was instructed and demonstrated reading a food label. Serving size and carbohydrate amounts were emphasized.   30-45 carbs per meal 3 meals a day  15-20 carbs per snack 3 snacks per day.   Patient has not formally seen a dietitian. Patient was given written materials including Nutrition in the [employer] [place].   My Fitness Pal Raj explained and demonstrated to patient.     Medication Instructions and Plan:     Current Outpatient Medications:   •  Accu-Chek FastClix Lancets misc, 1 each by Other route Daily., Disp: , Rfl:   •  Accu-Chek Guide test strip, 1 each by Other route Daily. use to check blood sugar every day, Disp: , Rfl:   •  albuterol sulfate  (90 Base) MCG/ACT inhaler, Inhale 2 puffs Every 4 (Four) Hours As Needed., Disp: , Rfl:   •  Blood Glucose Monitoring Suppl (Accu-Chek Guide) w/Device kit, USE AS DIRECTED TO CHECK BLOOD SUGAR EVERY DAY, Disp: , Rfl:   •  cyclobenzaprine (FLEXERIL) 10 MG tablet, Take 1 tablet by mouth At Night As Needed for Muscle Spasms., Disp: 30 tablet, Rfl: 0  •  Dapagliflozin Propanediol (Farxiga) 10 MG tablet, Take 10 mg by mouth Daily., Disp: 30 tablet, Rfl: 5  •  ibuprofen (ADVIL,MOTRIN) 800 MG tablet, Take 1 tablet by mouth Every 8 (Eight) Hours As Needed for Moderate Pain ., Disp: 90 tablet, Rfl: 2  •  Januvia 50 MG tablet, TAKE 1 TABLET BY MOUTH DAILY, Disp: 30 tablet, Rfl: 2   Medication  list was reviewed with patient. Patient denies questions or concerns regarding current medication therapy. Patient was instructed to continue medications as prescribed.     Exercise:   Patient has been instructed to walk for 10 minutes after each meal initially and build strength and endurance to achieve 30 minutes of sustained exercise per day; recommended patient seek advice from Primary Care Provider prior to beginning any exercise program if other health concerns exist.     Problem solving and reducing risks:   I explained the importance of keeping provider appointments, taking medications as prescribed, having laboratory work performed as ordered by provider and seeking care as soon as possible when complications occur.     Follow up Instructions and Plan:Patient was encouraged to contact DSME staff with questions and or concerns.  DSME staff contact information was given to patient.  Patient will be contacted when group classes resume.          Start Time: 8:30  End Time: 9:30    Julianna Holcomb RN, BSN  04/11/2022

## 2022-04-26 ENCOUNTER — OFFICE VISIT (OUTPATIENT)
Dept: FAMILY MEDICINE CLINIC | Facility: CLINIC | Age: 27
End: 2022-04-26

## 2022-04-26 VITALS
SYSTOLIC BLOOD PRESSURE: 128 MMHG | HEART RATE: 90 BPM | WEIGHT: 254 LBS | HEIGHT: 65 IN | TEMPERATURE: 98.2 F | DIASTOLIC BLOOD PRESSURE: 72 MMHG | BODY MASS INDEX: 42.32 KG/M2 | OXYGEN SATURATION: 98 %

## 2022-04-26 DIAGNOSIS — E11.65 TYPE 2 DIABETES MELLITUS WITH HYPERGLYCEMIA, WITHOUT LONG-TERM CURRENT USE OF INSULIN: Primary | ICD-10-CM

## 2022-04-26 DIAGNOSIS — Z11.59 NEED FOR HEPATITIS C SCREENING TEST: ICD-10-CM

## 2022-04-26 DIAGNOSIS — E66.01 CLASS 3 SEVERE OBESITY WITH SERIOUS COMORBIDITY AND BODY MASS INDEX (BMI) OF 40.0 TO 44.9 IN ADULT, UNSPECIFIED OBESITY TYPE: ICD-10-CM

## 2022-04-26 DIAGNOSIS — J45.20 MILD INTERMITTENT ASTHMA, UNSPECIFIED WHETHER COMPLICATED: ICD-10-CM

## 2022-04-26 PROBLEM — E66.813 CLASS 3 SEVERE OBESITY WITH SERIOUS COMORBIDITY AND BODY MASS INDEX (BMI) OF 40.0 TO 44.9 IN ADULT: Status: ACTIVE | Noted: 2022-04-26

## 2022-04-26 PROCEDURE — 99214 OFFICE O/P EST MOD 30 MIN: CPT | Performed by: NURSE PRACTITIONER

## 2022-04-26 NOTE — ASSESSMENT & PLAN NOTE
Asthma is improving with treatment.  The patient is experiencing no daytime asthma symptoms. She is experiencing no nighttime asthma symptoms.  Medications: no change.  Follow up in 6 months, or sooner should new symptoms or problems arise.

## 2022-04-26 NOTE — ASSESSMENT & PLAN NOTE
Patient's (Body mass index is 42.27 kg/m².) indicates that they are morbidly obese (BMI > 40 or > 35 with obesity - related health condition) with health conditions that include diabetes mellitus . Weight is improving with lifestyle modifications. BMI is is above average; BMI management plan is completed. We discussed portion control and increasing exercise.

## 2022-04-26 NOTE — ASSESSMENT & PLAN NOTE
Diabetes is improving with treatment.   Continue current treatment regimen.  Discussed foot care.  Reminded to get yearly retinal exam.  patient will return for fasting labs in 1-2 weeks.  Diabetes will be reassessed in 3 months.

## 2022-04-26 NOTE — PROGRESS NOTES
"Chief Complaint  Follow-up, Diabetes, and Asthma    Subjective          DesMjose jque Mell Isaac presents to [facility]  History of Present Illness   26-year-old female presents today for 3-month follow-up.    Diabetes type 2, non-insulin-dependent: Her last A1c had increased to 7.5% 4 months ago, she has not gotten her preoffice visit labs done yet.  She is currently on Farxiga 10 mg and Januvia 50 mg. She has been seeing the diabetic educator and making changes to her diet.  She has lost 4 pounds since her last visit 3 months ago.  She was unable to get her labs done prior to her visit, she is not fasting today.    Asthma: she states she is not having any issues.  She does have an albuterol inhaler to use as needed.    She is due for Pap smear.    Current Outpatient Medications on File Prior to Visit   Medication Sig Dispense Refill   • Accu-Chek FastClix Lancets misc 1 each by Other route Daily.     • Accu-Chek Guide test strip 1 each by Other route Daily. use to check blood sugar every day     • albuterol sulfate  (90 Base) MCG/ACT inhaler Inhale 2 puffs Every 4 (Four) Hours As Needed.     • Blood Glucose Monitoring Suppl (Accu-Chek Guide) w/Device kit USE AS DIRECTED TO CHECK BLOOD SUGAR EVERY DAY     • cyclobenzaprine (FLEXERIL) 10 MG tablet Take 1 tablet by mouth At Night As Needed for Muscle Spasms. 30 tablet 0   • Dapagliflozin Propanediol (Farxiga) 10 MG tablet Take 10 mg by mouth Daily. 30 tablet 5   • ibuprofen (ADVIL,MOTRIN) 800 MG tablet Take 1 tablet by mouth Every 8 (Eight) Hours As Needed for Moderate Pain . 90 tablet 2   • Januvia 50 MG tablet TAKE 1 TABLET BY MOUTH DAILY 30 tablet 2     No current facility-administered medications on file prior to visit.       Objective   Vital Signs:   /72   Pulse 90   Temp 98.2 °F (36.8 °C)   Ht 165.1 cm (65\")   Wt 115 kg (254 lb)   SpO2 98%   BMI 42.27 kg/m²     Physical Exam  Vitals reviewed. "   Constitutional:       Appearance: Normal appearance. She is well-developed. She is morbidly obese.   Neck:      Thyroid: No thyroid mass, thyromegaly or thyroid tenderness.   Cardiovascular:      Rate and Rhythm: Normal rate and regular rhythm.      Heart sounds: No murmur heard.    No friction rub. No gallop.   Pulmonary:      Effort: Pulmonary effort is normal.      Breath sounds: Normal breath sounds. No wheezing or rhonchi.   Lymphadenopathy:      Cervical: No cervical adenopathy.   Skin:     General: Skin is warm and dry.   Neurological:      Mental Status: She is alert and oriented to person, place, and time.      Cranial Nerves: No cranial nerve deficit.   Psychiatric:         Mood and Affect: Mood and affect normal.         Behavior: Behavior normal.         Thought Content: Thought content normal. Thought content does not include homicidal or suicidal ideation.         Judgment: Judgment normal.        Result Review :     CMP    CMP 6/7/21 9/3/21 12/23/21   Glucose 126 (A) 88 128 (A)   BUN 9 9 10   Creatinine 0.54 (A) 0.61 0.65   eGFR  Am >150 144 133   Sodium 140 138 136   Potassium 4.6 4.4 4.1   Chloride 102 100 101   Calcium 9.8 9.9 9.7   Albumin 4.90 5.10 5.10   Total Bilirubin 0.4 0.3 0.3   Alkaline Phosphatase 64 70 63   AST (SGOT) 30 24 33 (A)   ALT (SGPT) 39 (A) 41 (A) 45 (A)   (A) Abnormal value            CBC    CBC 6/7/21 9/3/21 12/23/21   WBC 6.27 6.47 6.31   RBC 4.78 4.74 4.71   Hemoglobin 14.1 13.5 13.6   Hematocrit 42.6 40.8 40.5   MCV 89.1 86.1 86.0   MCH 29.5 28.5 28.9   MCHC 33.1 33.1 33.6   RDW 12.2 (A) 12.2 (A) 12.0 (A)   Platelets 409 393 471 (A)   (A) Abnormal value            Lipid Panel    Lipid Panel 6/7/21 12/23/21   Total Cholesterol 184 161   Triglycerides 124 98   HDL Cholesterol 30 (A) 32 (A)   VLDL Cholesterol 23 18   LDL Cholesterol  131 (A) 111 (A)   LDL/HDL Ratio 4.31 3.42   (A) Abnormal value            TSH    TSH 6/7/21   TSH 1.520           A1C Last 3 Results     HGBA1C Last 3 Results 6/7/21 9/3/21 12/23/21   Hemoglobin A1C 6.60 (A) 7.03 (A) 7.53 (A)   (A) Abnormal value            Microalbumin    Microalbumin 1/28/22   Microalbumin, Urine <1.2                     Assessment and Plan    Diagnoses and all orders for this visit:    1. Type 2 diabetes mellitus with hyperglycemia, without long-term current use of insulin (HCC) (Primary)  Assessment & Plan:  Diabetes is improving with treatment.   Continue current treatment regimen.  Discussed foot care.  Reminded to get yearly retinal exam.  patient will return for fasting labs in 1-2 weeks.  Diabetes will be reassessed in 3 months.      2. Mild intermittent asthma, unspecified whether complicated  Assessment & Plan:  Asthma is improving with treatment.  The patient is experiencing no daytime asthma symptoms. She is experiencing no nighttime asthma symptoms.  Medications: no change.  Follow up in 6 months, or sooner should new symptoms or problems arise.          3. Class 3 severe obesity with serious comorbidity and body mass index (BMI) of 40.0 to 44.9 in adult, unspecified obesity type (HCC)  Assessment & Plan:  Patient's (Body mass index is 42.27 kg/m².) indicates that they are morbidly obese (BMI > 40 or > 35 with obesity - related health condition) with health conditions that include diabetes mellitus . Weight is improving with lifestyle modifications. BMI is is above average; BMI management plan is completed. We discussed portion control and increasing exercise.       4. Need for hepatitis C screening test  -     Hepatitis C Antibody; Future  Patient will return for fasting labs and then she will return in about 2 weeks for a Pap smear.    Follow Up   Return for pap.  Patient was given instructions and counseling regarding her condition or for health maintenance advice. Please see specific information pulled into the AVS if appropriate.

## 2022-04-29 ENCOUNTER — LAB (OUTPATIENT)
Dept: FAMILY MEDICINE CLINIC | Facility: CLINIC | Age: 27
End: 2022-04-29

## 2022-04-29 DIAGNOSIS — E11.65 TYPE 2 DIABETES MELLITUS WITH HYPERGLYCEMIA, WITHOUT LONG-TERM CURRENT USE OF INSULIN: ICD-10-CM

## 2022-04-29 DIAGNOSIS — J45.20 MILD INTERMITTENT ASTHMA, UNSPECIFIED WHETHER COMPLICATED: ICD-10-CM

## 2022-04-29 DIAGNOSIS — Z11.59 NEED FOR HEPATITIS C SCREENING TEST: ICD-10-CM

## 2022-04-29 LAB
BASOPHILS # BLD AUTO: 0.03 10*3/MM3 (ref 0–0.2)
BASOPHILS NFR BLD AUTO: 0.5 % (ref 0–1.5)
CHOLEST SERPL-MCNC: 167 MG/DL (ref 0–200)
DEPRECATED RDW RBC AUTO: 39.7 FL (ref 37–54)
EOSINOPHIL # BLD AUTO: 0.15 10*3/MM3 (ref 0–0.4)
EOSINOPHIL NFR BLD AUTO: 2.7 % (ref 0.3–6.2)
ERYTHROCYTE [DISTWIDTH] IN BLOOD BY AUTOMATED COUNT: 12.6 % (ref 12.3–15.4)
HBA1C MFR BLD: 6.6 % (ref 4.8–5.6)
HCT VFR BLD AUTO: 40.2 % (ref 34–46.6)
HCV AB SER DONR QL: NORMAL
HDLC SERPL-MCNC: 35 MG/DL (ref 40–60)
HGB BLD-MCNC: 13.1 G/DL (ref 12–15.9)
IMM GRANULOCYTES # BLD AUTO: 0.02 10*3/MM3 (ref 0–0.05)
IMM GRANULOCYTES NFR BLD AUTO: 0.4 % (ref 0–0.5)
LDLC SERPL CALC-MCNC: 115 MG/DL (ref 0–100)
LDLC/HDLC SERPL: 3.27 {RATIO}
LYMPHOCYTES # BLD AUTO: 2.34 10*3/MM3 (ref 0.7–3.1)
LYMPHOCYTES NFR BLD AUTO: 41.3 % (ref 19.6–45.3)
MCH RBC QN AUTO: 28.5 PG (ref 26.6–33)
MCHC RBC AUTO-ENTMCNC: 32.6 G/DL (ref 31.5–35.7)
MCV RBC AUTO: 87.6 FL (ref 79–97)
MONOCYTES # BLD AUTO: 0.5 10*3/MM3 (ref 0.1–0.9)
MONOCYTES NFR BLD AUTO: 8.8 % (ref 5–12)
NEUTROPHILS NFR BLD AUTO: 2.62 10*3/MM3 (ref 1.7–7)
NEUTROPHILS NFR BLD AUTO: 46.3 % (ref 42.7–76)
NRBC BLD AUTO-RTO: 0 /100 WBC (ref 0–0.2)
PLATELET # BLD AUTO: 332 10*3/MM3 (ref 140–450)
PMV BLD AUTO: 10.9 FL (ref 6–12)
RBC # BLD AUTO: 4.59 10*6/MM3 (ref 3.77–5.28)
TRIGL SERPL-MCNC: 88 MG/DL (ref 0–150)
VLDLC SERPL-MCNC: 17 MG/DL (ref 5–40)
WBC NRBC COR # BLD: 5.66 10*3/MM3 (ref 3.4–10.8)

## 2022-04-29 PROCEDURE — 83036 HEMOGLOBIN GLYCOSYLATED A1C: CPT | Performed by: NURSE PRACTITIONER

## 2022-04-29 PROCEDURE — 86803 HEPATITIS C AB TEST: CPT | Performed by: NURSE PRACTITIONER

## 2022-04-29 PROCEDURE — 85025 COMPLETE CBC W/AUTO DIFF WBC: CPT | Performed by: NURSE PRACTITIONER

## 2022-04-29 PROCEDURE — 80061 LIPID PANEL: CPT | Performed by: NURSE PRACTITIONER

## 2022-04-29 PROCEDURE — 36415 COLL VENOUS BLD VENIPUNCTURE: CPT | Performed by: NURSE PRACTITIONER

## 2022-04-29 PROCEDURE — 80053 COMPREHEN METABOLIC PANEL: CPT | Performed by: NURSE PRACTITIONER

## 2022-04-30 LAB
ALBUMIN SERPL-MCNC: 4.8 G/DL (ref 3.5–5.2)
ALBUMIN/GLOB SERPL: 1.8 G/DL
ALP SERPL-CCNC: 57 U/L (ref 39–117)
ALT SERPL W P-5'-P-CCNC: 27 U/L (ref 1–33)
ANION GAP SERPL CALCULATED.3IONS-SCNC: 12.9 MMOL/L (ref 5–15)
AST SERPL-CCNC: 18 U/L (ref 1–32)
BILIRUB SERPL-MCNC: 0.5 MG/DL (ref 0–1.2)
BUN SERPL-MCNC: 11 MG/DL (ref 6–20)
BUN/CREAT SERPL: 17.5 (ref 7–25)
CALCIUM SPEC-SCNC: 9.4 MG/DL (ref 8.6–10.5)
CHLORIDE SERPL-SCNC: 100 MMOL/L (ref 98–107)
CO2 SERPL-SCNC: 25.1 MMOL/L (ref 22–29)
CREAT SERPL-MCNC: 0.63 MG/DL (ref 0.57–1)
EGFRCR SERPLBLD CKD-EPI 2021: 124.9 ML/MIN/1.73
GLOBULIN UR ELPH-MCNC: 2.7 GM/DL
GLUCOSE SERPL-MCNC: 85 MG/DL (ref 65–99)
POTASSIUM SERPL-SCNC: 4 MMOL/L (ref 3.5–5.2)
PROT SERPL-MCNC: 7.5 G/DL (ref 6–8.5)
SODIUM SERPL-SCNC: 138 MMOL/L (ref 136–145)

## 2022-05-16 ENCOUNTER — OFFICE VISIT (OUTPATIENT)
Dept: FAMILY MEDICINE CLINIC | Facility: CLINIC | Age: 27
End: 2022-05-16

## 2022-05-16 VITALS
TEMPERATURE: 98.7 F | HEART RATE: 79 BPM | HEIGHT: 65 IN | OXYGEN SATURATION: 98 % | WEIGHT: 257.2 LBS | SYSTOLIC BLOOD PRESSURE: 122 MMHG | BODY MASS INDEX: 42.85 KG/M2 | DIASTOLIC BLOOD PRESSURE: 78 MMHG

## 2022-05-16 DIAGNOSIS — E78.5 HYPERLIPIDEMIA LDL GOAL <70: ICD-10-CM

## 2022-05-16 DIAGNOSIS — E11.9 TYPE 2 DIABETES MELLITUS WITHOUT COMPLICATION, WITHOUT LONG-TERM CURRENT USE OF INSULIN: ICD-10-CM

## 2022-05-16 DIAGNOSIS — Z12.4 ENCOUNTER FOR PAPANICOLAOU SMEAR OF CERVIX: Primary | ICD-10-CM

## 2022-05-16 LAB
C TRACH RRNA CVX QL NAA+PROBE: NOT DETECTED
N GONORRHOEA RRNA SPEC QL NAA+PROBE: NOT DETECTED

## 2022-05-16 PROCEDURE — 99214 OFFICE O/P EST MOD 30 MIN: CPT | Performed by: NURSE PRACTITIONER

## 2022-05-16 PROCEDURE — G0123 SCREEN CERV/VAG THIN LAYER: HCPCS | Performed by: NURSE PRACTITIONER

## 2022-05-16 PROCEDURE — 87591 N.GONORRHOEAE DNA AMP PROB: CPT | Performed by: NURSE PRACTITIONER

## 2022-05-16 PROCEDURE — 99395 PREV VISIT EST AGE 18-39: CPT | Performed by: NURSE PRACTITIONER

## 2022-05-16 PROCEDURE — 87491 CHLMYD TRACH DNA AMP PROBE: CPT | Performed by: NURSE PRACTITIONER

## 2022-05-16 RX ORDER — ALBUTEROL SULFATE 90 UG/1
2 AEROSOL, METERED RESPIRATORY (INHALATION) EVERY 4 HOURS PRN
Qty: 8 G | Refills: 5 | Status: SHIPPED | OUTPATIENT
Start: 2022-05-16 | End: 2022-11-29 | Stop reason: SDUPTHER

## 2022-05-16 RX ORDER — LANCETS
1 EACH MISCELLANEOUS DAILY
Qty: 90 EACH | Refills: 3 | Status: SHIPPED | OUTPATIENT
Start: 2022-05-16 | End: 2022-12-27

## 2022-05-16 RX ORDER — BLOOD SUGAR DIAGNOSTIC
1 STRIP MISCELLANEOUS DAILY
Qty: 90 EACH | Refills: 3 | Status: SHIPPED | OUTPATIENT
Start: 2022-05-16

## 2022-05-16 RX ORDER — DAPAGLIFLOZIN 10 MG/1
10 TABLET, FILM COATED ORAL DAILY
Qty: 90 TABLET | Refills: 1 | Status: SHIPPED | OUTPATIENT
Start: 2022-05-16 | End: 2022-11-29 | Stop reason: SDUPTHER

## 2022-05-16 RX ORDER — ATORVASTATIN CALCIUM 10 MG/1
10 TABLET, FILM COATED ORAL NIGHTLY
Qty: 30 TABLET | Refills: 5 | Status: SHIPPED | OUTPATIENT
Start: 2022-05-16 | End: 2022-11-30 | Stop reason: SDUPTHER

## 2022-05-16 NOTE — ASSESSMENT & PLAN NOTE
Diabetes is improving with treatment.   Continue current treatment regimen.  Dietary recommendations for ADA diet.  Handouts on goal of blood sugars and standards of care for diabetes provided through [employer], see AVS.  Diabetes will be reassessed in 3 months.

## 2022-05-16 NOTE — PATIENT INSTRUCTIONS
Diabetes Mellitus and Nutrition, Adult  When you have diabetes, or diabetes mellitus, it is very important to have healthy eating habits because your blood sugar (glucose) levels are greatly affected by what you eat and drink. Eating healthy foods in the right amounts, at about the same times every day, can help you:  Control your blood glucose.  Lower your risk of heart disease.  Improve your blood pressure.  Reach or maintain a healthy weight.  What can affect my meal plan?  Every person with diabetes is different, and each person has different needs for a meal plan. Your health care provider may recommend that you work with a dietitian to make a meal plan that is best for you. Your meal plan may vary depending on factors such as:  The calories you need.  The medicines you take.  Your weight.  Your blood glucose, blood pressure, and cholesterol levels.  Your activity level.  Other health conditions you have, such as heart or kidney disease.  How do carbohydrates affect me?  Carbohydrates, also called carbs, affect your blood glucose level more than any other type of food. Eating carbs naturally raises the amount of glucose in your blood. Carb counting is a method for keeping track of how many carbs you eat. Counting carbs is important to keep your blood glucose at a healthy level, especially if you use insulin or take certain oral diabetes medicines.  It is important to know how many carbs you can safely have in each meal. This is different for every person. Your dietitian can help you calculate how many carbs you should have at each meal and for each snack.  How does alcohol affect me?  Alcohol can cause a sudden decrease in blood glucose (hypoglycemia), especially if you use insulin or take certain oral diabetes medicines. Hypoglycemia can be a life-threatening condition. Symptoms of hypoglycemia, such as sleepiness, dizziness, and confusion, are similar to symptoms of having too much alcohol.  Do not drink  "alcohol if:  Your health care provider tells you not to drink.  You are pregnant, may be pregnant, or are planning to become pregnant.  If you drink alcohol:  Do not drink on an empty stomach.  Limit how much you use to:  0-1 drink a day for women.  0-2 drinks a day for men.  Be aware of how much alcohol is in your drink. In the [place] one drink equals one 12 oz bottle of beer (355 mL), one 5 oz glass of wine (148 mL), or one 1½ oz glass of hard liquor (44 mL).  Keep yourself hydrated with water, diet soda, or unsweetened iced tea.  Keep in mind that regular soda, juice, and other mixers may contain a lot of sugar and must be counted as carbs.  What are tips for following this plan?    Reading food labels  Start by checking the serving size on the \"Nutrition Facts\" label of packaged foods and drinks. The amount of calories, carbs, fats, and other nutrients listed on the label is based on one serving of the item. Many items contain more than one serving per package.  Check the total grams (g) of carbs in one serving. You can calculate the number of servings of carbs in one serving by dividing the total carbs by 15. For example, if a food has 30 g of total carbs per serving, it would be equal to 2 servings of carbs.  Check the number of grams (g) of saturated fats and trans fats in one serving. Choose foods that have a low amount or none of these fats.  Check the number of milligrams (mg) of salt (sodium) in one serving. Most people should limit total sodium intake to less than 2,300 mg per day.  Always check the nutrition information of foods labeled as \"low-fat\" or \"nonfat.\" These foods may be higher in added sugar or refined carbs and should be avoided.  Talk to your dietitian to identify your daily goals for nutrients listed on the label.  Shopping  Avoid buying canned, pre-made, or processed foods. These foods tend to be high in fat, sodium, and added sugar.  Shop around the outside edge of the grocery store. This " is where you will most often find fresh fruits and vegetables, bulk grains, fresh meats, and fresh dairy.  Cooking  Use low-heat cooking methods, such as baking, instead of high-heat cooking methods like deep frying.  Cook using healthy oils, such as olive, canola, or sunflower oil.  Avoid cooking with butter, cream, or high-fat meats.  Meal planning  Eat meals and snacks regularly, preferably at the same times every day. Avoid going long periods of time without eating.  Eat foods that are high in fiber, such as fresh fruits, vegetables, beans, and whole grains. Talk with your dietitian about how many servings of carbs you can eat at each meal.  Eat 4-6 oz (112-168 g) of lean protein each day, such as lean meat, chicken, fish, eggs, or tofu. One ounce (oz) of lean protein is equal to:  1 oz (28 g) of meat, chicken, or fish.  1 egg.  ¼ cup (62 g) of tofu.  Eat some foods each day that contain healthy fats, such as avocado, nuts, seeds, and fish.  What foods should I eat?  Fruits  Berries. Apples. Oranges. Peaches. Apricots. Plums. Grapes. [place]. Papaya. Pomegranate. Kiwi. Cherries.  Vegetables  Lettuce. Spinach. Leafy greens, including kale, chard, grant greens, and mustard greens. Beets. Cauliflower. Cabbage. Broccoli. Carrots. Green beans. Tomatoes. Peppers. Onions. Cucumbers. [place] sprouts.  Grains  Whole grains, such as whole-wheat or whole-grain bread, crackers, tortillas, cereal, and pasta. Unsweetened oatmeal. Quinoa. Brown or wild rice.  Meats and other proteins  Seafood. Poultry without skin. Lean cuts of poultry and beef. Tofu. Nuts. Seeds.  Dairy  Low-fat or fat-free dairy products such as milk, yogurt, and cheese.  The items listed above may not be a complete list of foods and beverages you can eat. Contact a dietitian for more information.  What foods should I avoid?  Fruits  Fruits canned with syrup.  Vegetables  Canned vegetables. Frozen vegetables with butter or cream sauce.  Grains  Refined  white flour and flour products such as bread, pasta, snack foods, and cereals. Avoid all processed foods.  Meats and other proteins  Fatty cuts of meat. Poultry with skin. Breaded or fried meats. Processed meat. Avoid saturated fats.  Dairy  Full-fat yogurt, cheese, or milk.  Beverages  Sweetened drinks, such as soda or iced tea.  The items listed above may not be a complete list of foods and beverages you should avoid. Contact a dietitian for more information.  Questions to ask a health care provider  Do I need to meet with a diabetes educator?  Do I need to meet with a dietitian?  What number can I call if I have questions?  When are the best times to check my blood glucose?  Where to find more information:  American Diabetes Association: diabetes.org  Academy of Nutrition and Dietetics: www.eatright.org  National [place] of Diabetes and Digestive and Kidney Diseases: www.niddk.nih.gov  Association of Diabetes Care and Education Specialists: www.diabeteseducator.org  Summary  It is important to have healthy eating habits because your blood sugar (glucose) levels are greatly affected by what you eat and drink.  A healthy meal plan will help you control your blood glucose and maintain a healthy lifestyle.  Your health care provider may recommend that you work with a dietitian to make a meal plan that is best for you.  Keep in mind that carbohydrates (carbs) and alcohol have immediate effects on your blood glucose levels. It is important to count carbs and to use alcohol carefully.  This information is not intended to replace advice given to you by your health care provider. Make sure you discuss any questions you have with your health care provider.  Document Revised: 11/24/2020 Document Reviewed: 11/24/2020  Elsevier Patient Education © 2021 Elsevier Inc.

## 2022-05-16 NOTE — PROGRESS NOTES
Chief Complaint  Gynecologic Exam    Subjective          DesMonique Mell Isaac presents to [facility]  History of Present Illness   SUBJECTIVE:   26 y.o. female for annual routine Pap and checkup.    She is also here for follow-up.  She had her labs drawn 2 weeks ago    Diabetes type 2, non-insulin-dependent: A1c was 6.6% which was improvement from previous of 7.5%.  She is on Farxiga 10 mg daily and Januvia 50 mg daily.    Her LDL is 115, HDL 35, total cholesterol 167, triglycerides 88.  Hepatitis C screening was negative.  CMP unremarkable.    She is obese with a BMI greater than 42.    Current Outpatient Medications   Medication Sig Dispense Refill   • Accu-Chek FastClix Lancets misc 1 each by Other route Daily. 90 each 3   • Accu-Chek Guide test strip 1 each by Other route Daily. use to check blood sugar every day 90 each 3   • albuterol sulfate  (90 Base) MCG/ACT inhaler Inhale 2 puffs Every 4 (Four) Hours As Needed for Wheezing or Shortness of Air. 8 g 5   • Blood Glucose Monitoring Suppl (Accu-Chek Guide) w/Device kit USE AS DIRECTED TO CHECK BLOOD SUGAR EVERY DAY     • cyclobenzaprine (FLEXERIL) 10 MG tablet Take 1 tablet by mouth At Night As Needed for Muscle Spasms. 30 tablet 0   • Dapagliflozin Propanediol (Farxiga) 10 MG tablet Take 10 mg by mouth Daily. 90 tablet 1   • ibuprofen (ADVIL,MOTRIN) 800 MG tablet Take 1 tablet by mouth Every 8 (Eight) Hours As Needed for Moderate Pain . 90 tablet 2   • SITagliptin (Januvia) 50 MG tablet Take 1 tablet by mouth Daily. 90 tablet 1   • atorvastatin (LIPITOR) 10 MG tablet Take 1 tablet by mouth Every Night. 30 tablet 5     No current facility-administered medications for this visit.     Allergies: Latex, natural rubber and Metformin   LMP 5/1/2022.    ROS:  Feeling well. No dyspnea or chest pain on exertion.  No abdominal pain, change in bowel habits, black or bloody stools.  No urinary tract symptoms. GYN ROS: normal  "menses, no abnormal bleeding, pelvic pain or discharge, no breast pain or new or enlarging lumps on self exam. No neurological complaints.    OBJECTIVE:   The patient appears well, alert, oriented x 3, in no distress.  /78   Pulse 79   Temp 98.7 °F (37.1 °C)   Ht 165.1 cm (65\")   Wt 117 kg (257 lb 3.2 oz)   SpO2 98%   BMI 42.80 kg/m²   ENT normal.  Neck supple. No adenopathy or thyromegaly. HANS. Lungs are clear, good air entry, no wheezes, rhonchi or rales. S1 and S2 normal, no murmurs, regular rate and rhythm. Abdomen soft without tenderness, guarding, mass or organomegaly. Extremities show no edema, normal peripheral pulses. Neurological is normal, no focal findings.    BREAST EXAM: breasts appear normal, no suspicious masses, no skin or nipple changes or axillary nodes    PELVIC EXAM: normal external genitalia, vulva, vagina, cervix, uterus and adnexa, PAP: Pap smear done today, thin-prep method, exam chaperoned by Danny Lu CMA.    ASSESSMENT:   well woman    PLAN:   pap smear  counseled on breast self exam and adequate intake of calcium and vitamin D  Objective   Vital Signs:   /78   Pulse 79   Temp 98.7 °F (37.1 °C)   Ht 165.1 cm (65\")   Wt 117 kg (257 lb 3.2 oz)   SpO2 98%   BMI 42.80 kg/m²        Result Review :                 Assessment and Plan    Diagnoses and all orders for this visit:    1. Encounter for Papanicolaou smear of cervix (Primary)  -     IGP, Rfx Aptima HPV ASCU  -     Chlamydia trachomatis, Neisseria gonorrhoeae, PCR - Swab, Cervix    2. Type 2 diabetes mellitus without complication, without long-term current use of insulin (HCC)  Assessment & Plan:  Diabetes is improving with treatment.   Continue current treatment regimen.  Dietary recommendations for ADA diet.  Handouts on goal of blood sugars and standards of care for diabetes provided through [facility], see AVS.  Diabetes will be reassessed in 3 months.      3. Hyperlipidemia LDL goal <70  Assessment & " Plan:  Lipid abnormalities are newly identified.  I discussed with her that the LDL goal is to be less than 70 due to her diabetes.  I will start her on atorvastatin 10 mg nightly.  Lipids will be reassessed in 3 months.      Other orders  -     atorvastatin (LIPITOR) 10 MG tablet; Take 1 tablet by mouth Every Night.  Dispense: 30 tablet; Refill: 5  -     albuterol sulfate  (90 Base) MCG/ACT inhaler; Inhale 2 puffs Every 4 (Four) Hours As Needed for Wheezing or Shortness of Air.  Dispense: 8 g; Refill: 5  -     Accu-Chek Guide test strip; 1 each by Other route Daily. use to check blood sugar every day  Dispense: 90 each; Refill: 3  -     Accu-Chek FastClix Lancets misc; 1 each by Other route Daily.  Dispense: 90 each; Refill: 3  -     Dapagliflozin Propanediol (Farxiga) 10 MG tablet; Take 10 mg by mouth Daily.  Dispense: 90 tablet; Refill: 1  -     SITagliptin (Januvia) 50 MG tablet; Take 1 tablet by mouth Daily.  Dispense: 90 tablet; Refill: 1      Follow Up   Return keep scheduled apt to est care with PERLA Maldonado.  Patient was given instructions and counseling regarding her condition or for health maintenance advice. Please see specific information pulled into the AVS if appropriate.

## 2022-05-16 NOTE — ASSESSMENT & PLAN NOTE
Lipid abnormalities are newly identified.  I discussed with her that the LDL goal is to be less than 70 due to her diabetes.  I will start her on atorvastatin 10 mg nightly.  Lipids will be reassessed in 3 months.

## 2022-05-20 LAB
CONV .: NORMAL
CYTOLOGIST CVX/VAG CYTO: NORMAL
CYTOLOGY CVX/VAG DOC CYTO: NORMAL
CYTOLOGY CVX/VAG DOC THIN PREP: NORMAL
DX ICD CODE: NORMAL
HIV 1 & 2 AB SER-IMP: NORMAL
OTHER STN SPEC: NORMAL
STAT OF ADQ CVX/VAG CYTO-IMP: NORMAL

## 2022-08-25 ENCOUNTER — OFFICE VISIT (OUTPATIENT)
Dept: FAMILY MEDICINE CLINIC | Facility: CLINIC | Age: 27
End: 2022-08-25

## 2022-08-25 VITALS
BODY MASS INDEX: 44.12 KG/M2 | SYSTOLIC BLOOD PRESSURE: 132 MMHG | DIASTOLIC BLOOD PRESSURE: 64 MMHG | HEIGHT: 65 IN | TEMPERATURE: 98.2 F | HEART RATE: 85 BPM | WEIGHT: 264.8 LBS | OXYGEN SATURATION: 97 %

## 2022-08-25 DIAGNOSIS — E66.01 CLASS 3 SEVERE OBESITY WITH SERIOUS COMORBIDITY AND BODY MASS INDEX (BMI) OF 40.0 TO 44.9 IN ADULT, UNSPECIFIED OBESITY TYPE: ICD-10-CM

## 2022-08-25 DIAGNOSIS — E11.9 TYPE 2 DIABETES MELLITUS WITHOUT COMPLICATION, WITHOUT LONG-TERM CURRENT USE OF INSULIN: Primary | ICD-10-CM

## 2022-08-25 DIAGNOSIS — E78.5 HYPERLIPIDEMIA LDL GOAL <70: ICD-10-CM

## 2022-08-25 LAB
ALBUMIN SERPL-MCNC: 4.8 G/DL (ref 3.5–5.2)
ALBUMIN/GLOB SERPL: 2 G/DL
ALP SERPL-CCNC: 71 U/L (ref 39–117)
ALT SERPL W P-5'-P-CCNC: 42 U/L (ref 1–33)
ANION GAP SERPL CALCULATED.3IONS-SCNC: 9.4 MMOL/L (ref 5–15)
AST SERPL-CCNC: 26 U/L (ref 1–32)
BASOPHILS # BLD AUTO: 0.03 10*3/MM3 (ref 0–0.2)
BASOPHILS NFR BLD AUTO: 0.4 % (ref 0–1.5)
BILIRUB SERPL-MCNC: 0.2 MG/DL (ref 0–1.2)
BUN SERPL-MCNC: 16 MG/DL (ref 6–20)
BUN/CREAT SERPL: 28.6 (ref 7–25)
CALCIUM SPEC-SCNC: 10 MG/DL (ref 8.6–10.5)
CHLORIDE SERPL-SCNC: 104 MMOL/L (ref 98–107)
CO2 SERPL-SCNC: 26.6 MMOL/L (ref 22–29)
CREAT SERPL-MCNC: 0.56 MG/DL (ref 0.57–1)
DEPRECATED RDW RBC AUTO: 41.5 FL (ref 37–54)
EGFRCR SERPLBLD CKD-EPI 2021: 128.5 ML/MIN/1.73
EOSINOPHIL # BLD AUTO: 0.16 10*3/MM3 (ref 0–0.4)
EOSINOPHIL NFR BLD AUTO: 2.4 % (ref 0.3–6.2)
ERYTHROCYTE [DISTWIDTH] IN BLOOD BY AUTOMATED COUNT: 12.7 % (ref 12.3–15.4)
GLOBULIN UR ELPH-MCNC: 2.4 GM/DL
GLUCOSE SERPL-MCNC: 133 MG/DL (ref 65–99)
HBA1C MFR BLD: 7.1 % (ref 4.8–5.6)
HCT VFR BLD AUTO: 40.7 % (ref 34–46.6)
HGB BLD-MCNC: 13.3 G/DL (ref 12–15.9)
IMM GRANULOCYTES # BLD AUTO: 0.03 10*3/MM3 (ref 0–0.05)
IMM GRANULOCYTES NFR BLD AUTO: 0.4 % (ref 0–0.5)
LYMPHOCYTES # BLD AUTO: 2.58 10*3/MM3 (ref 0.7–3.1)
LYMPHOCYTES NFR BLD AUTO: 38.6 % (ref 19.6–45.3)
MCH RBC QN AUTO: 29.1 PG (ref 26.6–33)
MCHC RBC AUTO-ENTMCNC: 32.7 G/DL (ref 31.5–35.7)
MCV RBC AUTO: 89.1 FL (ref 79–97)
MONOCYTES # BLD AUTO: 0.49 10*3/MM3 (ref 0.1–0.9)
MONOCYTES NFR BLD AUTO: 7.3 % (ref 5–12)
NEUTROPHILS NFR BLD AUTO: 3.4 10*3/MM3 (ref 1.7–7)
NEUTROPHILS NFR BLD AUTO: 50.9 % (ref 42.7–76)
NRBC BLD AUTO-RTO: 0 /100 WBC (ref 0–0.2)
PLATELET # BLD AUTO: 376 10*3/MM3 (ref 140–450)
PMV BLD AUTO: 10.6 FL (ref 6–12)
POTASSIUM SERPL-SCNC: 4.3 MMOL/L (ref 3.5–5.2)
PROT SERPL-MCNC: 7.2 G/DL (ref 6–8.5)
RBC # BLD AUTO: 4.57 10*6/MM3 (ref 3.77–5.28)
SODIUM SERPL-SCNC: 140 MMOL/L (ref 136–145)
WBC NRBC COR # BLD: 6.69 10*3/MM3 (ref 3.4–10.8)

## 2022-08-25 PROCEDURE — 36415 COLL VENOUS BLD VENIPUNCTURE: CPT | Performed by: NURSE PRACTITIONER

## 2022-08-25 PROCEDURE — 83036 HEMOGLOBIN GLYCOSYLATED A1C: CPT | Performed by: NURSE PRACTITIONER

## 2022-08-25 PROCEDURE — 99214 OFFICE O/P EST MOD 30 MIN: CPT | Performed by: NURSE PRACTITIONER

## 2022-08-25 PROCEDURE — 80053 COMPREHEN METABOLIC PANEL: CPT | Performed by: NURSE PRACTITIONER

## 2022-08-25 PROCEDURE — 85025 COMPLETE CBC W/AUTO DIFF WBC: CPT | Performed by: NURSE PRACTITIONER

## 2022-08-25 RX ORDER — SEMAGLUTIDE 1.34 MG/ML
0.25 INJECTION, SOLUTION SUBCUTANEOUS WEEKLY
Qty: 1 PEN | Refills: 0 | COMMUNITY
Start: 2022-08-25 | End: 2022-11-29 | Stop reason: SDUPTHER

## 2022-08-25 RX ORDER — SEMAGLUTIDE 1.34 MG/ML
0.5 INJECTION, SOLUTION SUBCUTANEOUS WEEKLY
Qty: 3 PEN | Refills: 3 | Status: SHIPPED | OUTPATIENT
Start: 2022-08-25 | End: 2022-11-29 | Stop reason: SDUPTHER

## 2022-08-25 NOTE — ASSESSMENT & PLAN NOTE
Patient's (Body mass index is 44.07 kg/m².) indicates that they are morbidly obese (BMI > 40 or > 35 with obesity - related health condition) with health conditions that include diabetes mellitus . Weight is unchanged. BMI is is above average; BMI management plan is completed. We discussed portion control, increasing exercise and pharmacologic options including ozempic.

## 2022-08-25 NOTE — PROGRESS NOTES
"Chief Complaint  Establish Care    Subjective        DesMem Mell Isaac presents to [facility]  History of Present Illness  Presents today to establish care.  Patient has type 2 diabetes which she was diagnosed over a year and a half ago.  Patient has been taking Januvia and Farxiga.  Did not tolerate metformin.  Blood sugars are fairly controlled.  Patient is struggling with weight loss.  She has switched her beverages to 0-calorie or sugar-free.  Will occasionally drink a soda just to boost her when she feels she is hypoglycemic.  Cholesterol mildly elevated.  Currently taking atorvastatin 10 mg daily.    Objective   Vital Signs:  /64   Pulse 85   Temp 98.2 °F (36.8 °C)   Ht 165.1 cm (65\")   Wt 120 kg (264 lb 12.8 oz)   SpO2 97%   BMI 44.07 kg/m²   Estimated body mass index is 44.07 kg/m² as calculated from the following:    Height as of this encounter: 165.1 cm (65\").    Weight as of this encounter: 120 kg (264 lb 12.8 oz).          Physical Exam  Vitals reviewed.   Constitutional:       Appearance: Normal appearance. She is morbidly obese.   HENT:      Head: Normocephalic and atraumatic.      Right Ear: External ear normal.      Left Ear: External ear normal.      Mouth/Throat:      Pharynx: No oropharyngeal exudate.   Eyes:      Conjunctiva/sclera: Conjunctivae normal.      Pupils: Pupils are equal, round, and reactive to light.   Cardiovascular:      Rate and Rhythm: Normal rate and regular rhythm.      Heart sounds: No murmur heard.    No friction rub. No gallop.   Pulmonary:      Effort: Pulmonary effort is normal.      Breath sounds: Normal breath sounds. No wheezing or rhonchi.   Abdominal:      General: Bowel sounds are normal. There is no distension.      Palpations: Abdomen is soft.      Tenderness: There is no abdominal tenderness.   Skin:     General: Skin is warm and dry.   Neurological:      Mental Status: She is alert and oriented to person, place, " and time.   Psychiatric:         Mood and Affect: Mood and affect normal.         Behavior: Behavior normal.         Thought Content: Thought content normal.         Judgment: Judgment normal.        Result Review :                Assessment and Plan   Diagnoses and all orders for this visit:    1. Type 2 diabetes mellitus without complication, without long-term current use of insulin (HCC) (Primary)  -     CBC & Differential  -     Comprehensive Metabolic Panel  -     Hemoglobin A1c    2. Class 3 severe obesity with serious comorbidity and body mass index (BMI) of 40.0 to 44.9 in adult, unspecified obesity type (HCC)  Assessment & Plan:  Patient's (Body mass index is 44.07 kg/m².) indicates that they are morbidly obese (BMI > 40 or > 35 with obesity - related health condition) with health conditions that include diabetes mellitus . Weight is unchanged. BMI is is above average; BMI management plan is completed. We discussed portion control, increasing exercise and pharmacologic options including ozempic.       3. Hyperlipidemia LDL goal <70    Other orders  -     Semaglutide,0.25 or 0.5MG/DOS, (Ozempic, 0.25 or 0.5 MG/DOSE,) 2 MG/1.5ML solution pen-injector; Inject 0.5 mg under the skin into the appropriate area as directed 1 (One) Time Per Week.  Dispense: 3 pen; Refill: 3  -     Semaglutide,0.25 or 0.5MG/DOS, (Ozempic, 0.25 or 0.5 MG/DOSE,) 2 MG/1.5ML solution pen-injector; Inject 0.25 mg under the skin into the appropriate area as directed 1 (One) Time Per Week.  Dispense: 1 pen; Refill: 0  Provided sample of Ozempic in office.  Patient demonstrated how to give self injection.  Give 0.25 mg weekly for the first 4 weeks then increase to 0.5 mg weekly.  Stop Januvia 50 mg daily.  Continue Farxiga.  Work on losing 15 pounds in the next 3 months.  Cholesterol mildly elevated.  Follow low-cholesterol diet.  Continue atorvastatin 10 mg daily.         Follow Up   Return in about 3 months (around 11/25/2022), or if  symptoms worsen or fail to improve, for Next scheduled follow up.  Patient was given instructions and counseling regarding her condition or for health maintenance advice. Please see specific information pulled into the AVS if appropriate.

## 2022-11-29 ENCOUNTER — OFFICE VISIT (OUTPATIENT)
Dept: FAMILY MEDICINE CLINIC | Facility: CLINIC | Age: 27
End: 2022-11-29

## 2022-11-29 VITALS
HEART RATE: 89 BPM | TEMPERATURE: 98.3 F | WEIGHT: 259.8 LBS | BODY MASS INDEX: 43.28 KG/M2 | OXYGEN SATURATION: 96 % | HEIGHT: 65 IN | SYSTOLIC BLOOD PRESSURE: 124 MMHG | DIASTOLIC BLOOD PRESSURE: 78 MMHG

## 2022-11-29 DIAGNOSIS — Z23 NEED FOR TDAP VACCINATION: ICD-10-CM

## 2022-11-29 DIAGNOSIS — E66.01 CLASS 3 SEVERE OBESITY DUE TO EXCESS CALORIES WITH SERIOUS COMORBIDITY AND BODY MASS INDEX (BMI) OF 40.0 TO 44.9 IN ADULT: ICD-10-CM

## 2022-11-29 DIAGNOSIS — J45.20 MILD INTERMITTENT ASTHMA, UNSPECIFIED WHETHER COMPLICATED: ICD-10-CM

## 2022-11-29 DIAGNOSIS — E78.5 HYPERLIPIDEMIA LDL GOAL <70: ICD-10-CM

## 2022-11-29 DIAGNOSIS — E11.65 TYPE 2 DIABETES MELLITUS WITH HYPERGLYCEMIA, WITHOUT LONG-TERM CURRENT USE OF INSULIN: Primary | ICD-10-CM

## 2022-11-29 DIAGNOSIS — Z23 NEED FOR INFLUENZA VACCINATION: ICD-10-CM

## 2022-11-29 PROBLEM — Z83.3 FAMILY HISTORY OF DIABETES MELLITUS: Status: RESOLVED | Noted: 2021-06-07 | Resolved: 2022-11-29

## 2022-11-29 LAB
ALBUMIN SERPL-MCNC: 4.8 G/DL (ref 3.5–5.2)
ALBUMIN/GLOB SERPL: 1.8 G/DL
ALP SERPL-CCNC: 64 U/L (ref 39–117)
ALT SERPL W P-5'-P-CCNC: 43 U/L (ref 1–33)
ANION GAP SERPL CALCULATED.3IONS-SCNC: 9 MMOL/L (ref 5–15)
AST SERPL-CCNC: 24 U/L (ref 1–32)
BASOPHILS # BLD AUTO: 0.02 10*3/MM3 (ref 0–0.2)
BASOPHILS NFR BLD AUTO: 0.4 % (ref 0–1.5)
BILIRUB SERPL-MCNC: 0.4 MG/DL (ref 0–1.2)
BUN SERPL-MCNC: 9 MG/DL (ref 6–20)
BUN/CREAT SERPL: 16.1 (ref 7–25)
CALCIUM SPEC-SCNC: 9.4 MG/DL (ref 8.6–10.5)
CHLORIDE SERPL-SCNC: 102 MMOL/L (ref 98–107)
CHOLEST SERPL-MCNC: 199 MG/DL (ref 0–200)
CO2 SERPL-SCNC: 29 MMOL/L (ref 22–29)
CREAT SERPL-MCNC: 0.56 MG/DL (ref 0.57–1)
DEPRECATED RDW RBC AUTO: 39.3 FL (ref 37–54)
EGFRCR SERPLBLD CKD-EPI 2021: 127.7 ML/MIN/1.73
EOSINOPHIL # BLD AUTO: 0.18 10*3/MM3 (ref 0–0.4)
EOSINOPHIL NFR BLD AUTO: 3.2 % (ref 0.3–6.2)
ERYTHROCYTE [DISTWIDTH] IN BLOOD BY AUTOMATED COUNT: 12.3 % (ref 12.3–15.4)
GLOBULIN UR ELPH-MCNC: 2.6 GM/DL
GLUCOSE SERPL-MCNC: 131 MG/DL (ref 65–99)
HBA1C MFR BLD: 6.7 % (ref 4.8–5.6)
HCT VFR BLD AUTO: 41.7 % (ref 34–46.6)
HDLC SERPL-MCNC: 36 MG/DL (ref 40–60)
HGB BLD-MCNC: 13.4 G/DL (ref 12–15.9)
IMM GRANULOCYTES # BLD AUTO: 0.05 10*3/MM3 (ref 0–0.05)
IMM GRANULOCYTES NFR BLD AUTO: 0.9 % (ref 0–0.5)
LDLC SERPL CALC-MCNC: 129 MG/DL (ref 0–100)
LDLC/HDLC SERPL: 3.48 {RATIO}
LYMPHOCYTES # BLD AUTO: 2.22 10*3/MM3 (ref 0.7–3.1)
LYMPHOCYTES NFR BLD AUTO: 39.4 % (ref 19.6–45.3)
MCH RBC QN AUTO: 28.2 PG (ref 26.6–33)
MCHC RBC AUTO-ENTMCNC: 32.1 G/DL (ref 31.5–35.7)
MCV RBC AUTO: 87.6 FL (ref 79–97)
MONOCYTES # BLD AUTO: 0.39 10*3/MM3 (ref 0.1–0.9)
MONOCYTES NFR BLD AUTO: 6.9 % (ref 5–12)
NEUTROPHILS NFR BLD AUTO: 2.78 10*3/MM3 (ref 1.7–7)
NEUTROPHILS NFR BLD AUTO: 49.2 % (ref 42.7–76)
NRBC BLD AUTO-RTO: 0 /100 WBC (ref 0–0.2)
PLATELET # BLD AUTO: 366 10*3/MM3 (ref 140–450)
PMV BLD AUTO: 10.6 FL (ref 6–12)
POTASSIUM SERPL-SCNC: 4 MMOL/L (ref 3.5–5.2)
PROT SERPL-MCNC: 7.4 G/DL (ref 6–8.5)
RBC # BLD AUTO: 4.76 10*6/MM3 (ref 3.77–5.28)
SODIUM SERPL-SCNC: 140 MMOL/L (ref 136–145)
TRIGL SERPL-MCNC: 188 MG/DL (ref 0–150)
VLDLC SERPL-MCNC: 34 MG/DL (ref 5–40)
WBC NRBC COR # BLD: 5.64 10*3/MM3 (ref 3.4–10.8)

## 2022-11-29 PROCEDURE — 90471 IMMUNIZATION ADMIN: CPT | Performed by: NURSE PRACTITIONER

## 2022-11-29 PROCEDURE — 85025 COMPLETE CBC W/AUTO DIFF WBC: CPT | Performed by: NURSE PRACTITIONER

## 2022-11-29 PROCEDURE — 90472 IMMUNIZATION ADMIN EACH ADD: CPT | Performed by: NURSE PRACTITIONER

## 2022-11-29 PROCEDURE — 80061 LIPID PANEL: CPT | Performed by: NURSE PRACTITIONER

## 2022-11-29 PROCEDURE — 36415 COLL VENOUS BLD VENIPUNCTURE: CPT | Performed by: NURSE PRACTITIONER

## 2022-11-29 PROCEDURE — 99214 OFFICE O/P EST MOD 30 MIN: CPT | Performed by: NURSE PRACTITIONER

## 2022-11-29 PROCEDURE — 83036 HEMOGLOBIN GLYCOSYLATED A1C: CPT | Performed by: NURSE PRACTITIONER

## 2022-11-29 PROCEDURE — 90686 IIV4 VACC NO PRSV 0.5 ML IM: CPT | Performed by: NURSE PRACTITIONER

## 2022-11-29 PROCEDURE — 80053 COMPREHEN METABOLIC PANEL: CPT | Performed by: NURSE PRACTITIONER

## 2022-11-29 PROCEDURE — 90715 TDAP VACCINE 7 YRS/> IM: CPT | Performed by: NURSE PRACTITIONER

## 2022-11-29 RX ORDER — SITAGLIPTIN 50 MG/1
1 TABLET, FILM COATED ORAL DAILY
COMMUNITY
Start: 2022-11-27 | End: 2022-11-29

## 2022-11-29 RX ORDER — DAPAGLIFLOZIN 10 MG/1
10 TABLET, FILM COATED ORAL DAILY
Qty: 90 TABLET | Refills: 1 | Status: SHIPPED | OUTPATIENT
Start: 2022-11-29

## 2022-11-29 RX ORDER — SEMAGLUTIDE 1.34 MG/ML
0.5 INJECTION, SOLUTION SUBCUTANEOUS WEEKLY
Qty: 4.5 ML | Refills: 3 | Status: SHIPPED | OUTPATIENT
Start: 2022-11-29 | End: 2023-01-04 | Stop reason: SDUPTHER

## 2022-11-29 RX ORDER — ALBUTEROL SULFATE 90 UG/1
2 AEROSOL, METERED RESPIRATORY (INHALATION) EVERY 4 HOURS PRN
Qty: 8 G | Refills: 5 | Status: SHIPPED | OUTPATIENT
Start: 2022-11-29

## 2022-11-29 NOTE — ASSESSMENT & PLAN NOTE
Patient's (Body mass index is 43.23 kg/m².) indicates that they are morbidly obese (BMI > 40 or > 35 with obesity - related health condition) with health conditions that include diabetes mellitus and dyslipidemias . Weight is unchanged. BMI is is above average; BMI management plan is completed. We discussed portion control and increasing exercise.

## 2022-11-29 NOTE — ASSESSMENT & PLAN NOTE
Based on home readings diabetes is improving.  Check hemoglobin A1c today.  Discontinue Januvia.  Continue Ozempic and Farxiga.

## 2022-11-29 NOTE — PROGRESS NOTES
"Chief Complaint  Diabetes    Subjective         DesMonique Mell Isaac presents to [facility]  HPI   Presents today for 3-month follow-up on type 2 diabetes and hyperlipidemia.  She reports her blood sugars have been approximately 120.  Slightly elevated during Thanksgiving week.  She is tolerating Ozempic and Farxiga well.  She ran out of the Januvia.  Denies polyuria and polydipsia.  Tolerating atorvastatin 10 mg nightly well.    Social History     Socioeconomic History   • Marital status: Single   Tobacco Use   • Smoking status: Never   • Smokeless tobacco: Never   Vaping Use   • Vaping Use: Never used   Substance and Sexual Activity   • Alcohol use: Yes     Comment: current some day    • Drug use: Never   • Sexual activity: Not Currently     Partners: Male     Birth control/protection: Condom, None        Objective     Vitals:    11/29/22 0755   BP: 124/78   BP Location: Left arm   Patient Position: Sitting   Pulse: 89   Temp: 98.3 °F (36.8 °C)   TempSrc: Oral   SpO2: 96%   Weight: 118 kg (259 lb 12.8 oz)   Height: 165.1 cm (65\")        Body mass index is 43.23 kg/m².    Wt Readings from Last 3 Encounters:   11/29/22 118 kg (259 lb 12.8 oz)   08/25/22 120 kg (264 lb 12.8 oz)   07/14/22 118 kg (260 lb)       BP Readings from Last 3 Encounters:   11/29/22 124/78   08/25/22 132/64   07/14/22 139/83         Physical Exam  Vitals reviewed.   Constitutional:       Appearance: Normal appearance. She is morbidly obese.   HENT:      Head: Normocephalic and atraumatic.      Right Ear: External ear normal.      Left Ear: External ear normal.      Mouth/Throat:      Pharynx: No oropharyngeal exudate.   Eyes:      Conjunctiva/sclera: Conjunctivae normal.      Pupils: Pupils are equal, round, and reactive to light.   Cardiovascular:      Rate and Rhythm: Normal rate and regular rhythm.      Heart sounds: No murmur heard.    No friction rub. No gallop.   Pulmonary:      Effort: Pulmonary " effort is normal.      Breath sounds: Normal breath sounds. No wheezing or rhonchi.   Abdominal:      General: Bowel sounds are normal. There is no distension.      Palpations: Abdomen is soft.      Tenderness: There is no abdominal tenderness.   Skin:     General: Skin is warm and dry.   Neurological:      Mental Status: She is alert and oriented to person, place, and time.   Psychiatric:         Mood and Affect: Mood and affect normal.         Behavior: Behavior normal.         Thought Content: Thought content normal.         Judgment: Judgment normal.          Result Review :   The following data was reviewed by: PERLA Chris on 11/29/2022:      Procedures    Assessment and Plan   Diagnoses and all orders for this visit:    1. Type 2 diabetes mellitus with hyperglycemia, without long-term current use of insulin (HCC) (Primary)  Assessment & Plan:  Based on home readings diabetes is improving.  Check hemoglobin A1c today.  Discontinue Januvia.  Continue Ozempic and Farxiga.    Orders:  -     CBC & Differential  -     Comprehensive Metabolic Panel  -     Hemoglobin A1c  -     Semaglutide,0.25 or 0.5MG/DOS, (Ozempic, 0.25 or 0.5 MG/DOSE,) 2 MG/1.5ML solution pen-injector; Inject 0.5 mg under the skin into the appropriate area as directed 1 (One) Time Per Week for 90 days.  Dispense: 4.5 mL; Refill: 3  -     dapagliflozin Propanediol (Farxiga) 10 MG tablet; Take 10 mg by mouth Daily.  Dispense: 90 tablet; Refill: 1    2. Mild intermittent asthma, unspecified whether complicated  Assessment & Plan:  Asthma is well controlled.  The patient is experiencing no daytime asthma symptoms. She is experiencing no nighttime asthma symptoms.  Medications: no change.  Follow up in 3 months, or sooner should new symptoms or problems arise.        Orders:  -     albuterol sulfate  (90 Base) MCG/ACT inhaler; Inhale 2 puffs Every 4 (Four) Hours As Needed for Wheezing or Shortness of Air.  Dispense: 8 g; Refill: 5    3.  Hyperlipidemia LDL goal <70  Assessment & Plan:  LDL is 115.  Check lipid panel today.  If remains greater than 70 will increase Lipitor.    Orders:  -     Lipid panel    4. Class 3 severe obesity due to excess calories with serious comorbidity and body mass index (BMI) of 40.0 to 44.9 in adult (HCC)  Assessment & Plan:  Patient's (Body mass index is 43.23 kg/m².) indicates that they are morbidly obese (BMI > 40 or > 35 with obesity - related health condition) with health conditions that include diabetes mellitus and dyslipidemias . Weight is unchanged. BMI is is above average; BMI management plan is completed. We discussed portion control and increasing exercise.       5. Need for influenza vaccination  -     FluLaval/Fluzone >6 mos ([id number])    6. Need for Tdap vaccination  -     Tdap Vaccine Greater Than or Equal To 6yo IM          Follow Up   Return in about 3 months (around 2/28/2023), or if symptoms worsen or fail to improve, for Next scheduled follow up.  Patient was given instructions and counseling regarding her condition or for health maintenance advice. Please see specific information pulled into the AVS if appropriate.     Please note that portions of this note were completed with a voice recognition program.

## 2022-11-29 NOTE — ASSESSMENT & PLAN NOTE
Asthma is well controlled.  The patient is experiencing no daytime asthma symptoms. She is experiencing no nighttime asthma symptoms.  Medications: no change.  Follow up in 3 months, or sooner should new symptoms or problems arise.

## 2022-11-30 RX ORDER — ATORVASTATIN CALCIUM 20 MG/1
20 TABLET, FILM COATED ORAL NIGHTLY
Qty: 90 TABLET | Refills: 1 | Status: SHIPPED | OUTPATIENT
Start: 2022-11-30

## 2022-12-27 RX ORDER — LANCETS
EACH MISCELLANEOUS
Qty: 100 EACH | Refills: 2 | Status: SHIPPED | OUTPATIENT
Start: 2022-12-27

## 2023-01-04 DIAGNOSIS — E11.65 TYPE 2 DIABETES MELLITUS WITH HYPERGLYCEMIA, WITHOUT LONG-TERM CURRENT USE OF INSULIN: ICD-10-CM

## 2023-01-04 RX ORDER — SEMAGLUTIDE 1.34 MG/ML
0.5 INJECTION, SOLUTION SUBCUTANEOUS WEEKLY
Qty: 4.5 ML | Refills: 3 | Status: SHIPPED | OUTPATIENT
Start: 2023-01-04 | End: 2023-02-28

## 2023-02-28 ENCOUNTER — OFFICE VISIT (OUTPATIENT)
Dept: FAMILY MEDICINE CLINIC | Facility: CLINIC | Age: 28
End: 2023-02-28
Payer: COMMERCIAL

## 2023-02-28 VITALS
HEIGHT: 65 IN | TEMPERATURE: 98.4 F | OXYGEN SATURATION: 96 % | SYSTOLIC BLOOD PRESSURE: 124 MMHG | BODY MASS INDEX: 43.18 KG/M2 | HEART RATE: 84 BPM | WEIGHT: 259.2 LBS | DIASTOLIC BLOOD PRESSURE: 72 MMHG

## 2023-02-28 DIAGNOSIS — F41.9 ANXIETY: ICD-10-CM

## 2023-02-28 DIAGNOSIS — J45.20 MILD INTERMITTENT ASTHMA, UNSPECIFIED WHETHER COMPLICATED: ICD-10-CM

## 2023-02-28 DIAGNOSIS — F32.2 CURRENT SEVERE EPISODE OF MAJOR DEPRESSIVE DISORDER WITHOUT PSYCHOTIC FEATURES WITHOUT PRIOR EPISODE: ICD-10-CM

## 2023-02-28 DIAGNOSIS — E11.65 TYPE 2 DIABETES MELLITUS WITH HYPERGLYCEMIA, WITHOUT LONG-TERM CURRENT USE OF INSULIN: Primary | ICD-10-CM

## 2023-02-28 LAB
ALBUMIN SERPL-MCNC: 5.3 G/DL (ref 3.5–5.2)
ALBUMIN UR-MCNC: 1.7 MG/DL
ALBUMIN/GLOB SERPL: 1.8 G/DL
ALP SERPL-CCNC: 73 U/L (ref 39–117)
ALT SERPL W P-5'-P-CCNC: 41 U/L (ref 1–33)
ANION GAP SERPL CALCULATED.3IONS-SCNC: 9.5 MMOL/L (ref 5–15)
AST SERPL-CCNC: 24 U/L (ref 1–32)
BASOPHILS # BLD AUTO: 0.05 10*3/MM3 (ref 0–0.2)
BASOPHILS NFR BLD AUTO: 0.7 % (ref 0–1.5)
BILIRUB SERPL-MCNC: 0.4 MG/DL (ref 0–1.2)
BUN SERPL-MCNC: 12 MG/DL (ref 6–20)
BUN/CREAT SERPL: 20 (ref 7–25)
CALCIUM SPEC-SCNC: 10.1 MG/DL (ref 8.6–10.5)
CHLORIDE SERPL-SCNC: 104 MMOL/L (ref 98–107)
CO2 SERPL-SCNC: 25.5 MMOL/L (ref 22–29)
CREAT SERPL-MCNC: 0.6 MG/DL (ref 0.57–1)
DEPRECATED RDW RBC AUTO: 39 FL (ref 37–54)
EGFRCR SERPLBLD CKD-EPI 2021: 125.6 ML/MIN/1.73
EOSINOPHIL # BLD AUTO: 0.24 10*3/MM3 (ref 0–0.4)
EOSINOPHIL NFR BLD AUTO: 3.5 % (ref 0.3–6.2)
ERYTHROCYTE [DISTWIDTH] IN BLOOD BY AUTOMATED COUNT: 12.3 % (ref 12.3–15.4)
GLOBULIN UR ELPH-MCNC: 3 GM/DL
GLUCOSE SERPL-MCNC: 122 MG/DL (ref 65–99)
HBA1C MFR BLD: 7.7 % (ref 4.8–5.6)
HCT VFR BLD AUTO: 44.4 % (ref 34–46.6)
HGB BLD-MCNC: 14.7 G/DL (ref 12–15.9)
IMM GRANULOCYTES # BLD AUTO: 0.04 10*3/MM3 (ref 0–0.05)
IMM GRANULOCYTES NFR BLD AUTO: 0.6 % (ref 0–0.5)
LYMPHOCYTES # BLD AUTO: 2.57 10*3/MM3 (ref 0.7–3.1)
LYMPHOCYTES NFR BLD AUTO: 38 % (ref 19.6–45.3)
MCH RBC QN AUTO: 28.9 PG (ref 26.6–33)
MCHC RBC AUTO-ENTMCNC: 33.1 G/DL (ref 31.5–35.7)
MCV RBC AUTO: 87.4 FL (ref 79–97)
MONOCYTES # BLD AUTO: 0.51 10*3/MM3 (ref 0.1–0.9)
MONOCYTES NFR BLD AUTO: 7.5 % (ref 5–12)
NEUTROPHILS NFR BLD AUTO: 3.36 10*3/MM3 (ref 1.7–7)
NEUTROPHILS NFR BLD AUTO: 49.7 % (ref 42.7–76)
NRBC BLD AUTO-RTO: 0 /100 WBC (ref 0–0.2)
PLATELET # BLD AUTO: 336 10*3/MM3 (ref 140–450)
PMV BLD AUTO: 10.6 FL (ref 6–12)
POTASSIUM SERPL-SCNC: 4.4 MMOL/L (ref 3.5–5.2)
PROT SERPL-MCNC: 8.3 G/DL (ref 6–8.5)
RBC # BLD AUTO: 5.08 10*6/MM3 (ref 3.77–5.28)
SODIUM SERPL-SCNC: 139 MMOL/L (ref 136–145)
WBC NRBC COR # BLD: 6.77 10*3/MM3 (ref 3.4–10.8)

## 2023-02-28 PROCEDURE — 36415 COLL VENOUS BLD VENIPUNCTURE: CPT | Performed by: NURSE PRACTITIONER

## 2023-02-28 PROCEDURE — 85025 COMPLETE CBC W/AUTO DIFF WBC: CPT | Performed by: NURSE PRACTITIONER

## 2023-02-28 PROCEDURE — 80053 COMPREHEN METABOLIC PANEL: CPT | Performed by: NURSE PRACTITIONER

## 2023-02-28 PROCEDURE — 82043 UR ALBUMIN QUANTITATIVE: CPT | Performed by: NURSE PRACTITIONER

## 2023-02-28 PROCEDURE — 99214 OFFICE O/P EST MOD 30 MIN: CPT | Performed by: NURSE PRACTITIONER

## 2023-02-28 PROCEDURE — 83036 HEMOGLOBIN GLYCOSYLATED A1C: CPT | Performed by: NURSE PRACTITIONER

## 2023-02-28 RX ORDER — TIRZEPATIDE 2.5 MG/.5ML
2.5 INJECTION, SOLUTION SUBCUTANEOUS WEEKLY
Qty: 2 ML | Refills: 2 | Status: SHIPPED | OUTPATIENT
Start: 2023-02-28 | End: 2023-03-03 | Stop reason: CLARIF

## 2023-02-28 NOTE — PROGRESS NOTES
Chief Complaint  Diabetes and Asthma    Subjective         DesMonique Mell Isaac presents to [facility]  HPI   Presents today for 3-month follow-up on type 2 diabetes.  She reports her blood sugars are about 140-1 60.  Reports polydipsia.  Denies polyuria.  She was unable to  a refill on the Ozempic due to shortage at the pharmacy.  Patient reports she has been experiencing depression and anxiety for some time now.  Stressors from work.  When she gets home from work she will walk her dog and go to bed.  Denies suicidal ideations. No plan        PHQ-9 Depression Screening  Little interest or pleasure in doing things? 2-->more than half the days   Feeling down, depressed, or hopeless? 2-->more than half the days   Trouble falling or staying asleep, or sleeping too much? 1-->several days   Feeling tired or having little energy? 3-->nearly every day   Poor appetite or overeating? 3-->nearly every day   Feeling bad about yourself - or that you are a failure or have let yourself or your family down? 2-->more than half the days   Trouble concentrating on things, such as reading the newspaper or watching television? 1-->several days   Moving or speaking so slowly that other people could have noticed? Or the opposite - being so fidgety or restless that you have been moving around a lot more than usual? 1-->several days   Thoughts that you would be better off dead, or of hurting yourself in some way? (S) 1-->several days   PHQ-9 Total Score 16   If you checked off any problems, how difficult have these problems made it for you to do your work, take care of things at home, or get along with other people? somewhat difficult         KAILA-7  Feeling nervous, anxious or on edge: Several days  Not being able to stop or control worrying: Several days  Worrying too much about different things: Several days  Trouble Relaxing: Not at all  Being so restless that it is hard to sit still: Not at  "all  Feeling afraid as if something awful might happen: Several days  Becoming easily annoyed or irritable: Several days  KAILA 7 Total Score: 5  If you checked any problems, how difficult have these problems made it for you to do your work, take care of things at home, or get along with other people: Not difficult at all    Social History     Socioeconomic History   • Marital status: Single   Tobacco Use   • Smoking status: Never   • Smokeless tobacco: Never   Vaping Use   • Vaping Use: Never used   Substance and Sexual Activity   • Alcohol use: Yes     Comment: current some day    • Drug use: Never   • Sexual activity: Not Currently     Partners: Male     Birth control/protection: Condom, None        Objective     Vitals:    02/28/23 0820   BP: 124/72   BP Location: Left arm   Patient Position: Sitting   Cuff Size: Adult   Pulse: 84   Temp: 98.4 °F (36.9 °C)   TempSrc: Oral   SpO2: 96%   Weight: 118 kg (259 lb 3.2 oz)   Height: 165.1 cm (65\")        Body mass index is 43.13 kg/m².    Wt Readings from Last 3 Encounters:   02/28/23 118 kg (259 lb 3.2 oz)   11/29/22 118 kg (259 lb 12.8 oz)   08/25/22 120 kg (264 lb 12.8 oz)       BP Readings from Last 3 Encounters:   02/28/23 124/72   11/29/22 124/78   08/25/22 132/64         Physical Exam     Result Review :   The following data was reviewed by: PERLA Chris on 02/28/2023:  Common labs    Common Labs 4/29/22 4/29/22 4/29/22 4/29/22 8/25/22 8/25/22 8/25/22 11/29/22 11/29/22 11/29/22 11/29/22    1224 1224 1224 1224 1525 1525 1525 0857 0857 0857 0857   Glucose    85   133 (A)   131 (A)    BUN    11   16   9    Creatinine    0.63   0.56 (A)   0.56 (A)    Sodium    138   140   140    Potassium    4.0   4.3   4.0    Chloride    100   104   102    Calcium    9.4   10.0   9.4    Albumin    4.80   4.80   4.80    Total Bilirubin    0.5   0.2   0.4    Alkaline Phosphatase    57   71   64    AST (SGOT)    18   26   24    ALT (SGPT)    27   42 (A)   43 (A)    WBC 5.66    " 6.69   5.64      Hemoglobin 13.1    13.3   13.4      Hematocrit 40.2    40.7   41.7      Platelets 332    376   366      Total Cholesterol  167         199   Triglycerides  88         188 (A)   HDL Cholesterol  35 (A)         36 (A)   LDL Cholesterol   115 (A)         129 (A)   Hemoglobin A1C   6.60 (A)   7.10 (A)   6.70 (A)     (A) Abnormal value              Procedures    Assessment and Plan   Diagnoses and all orders for this visit:    1. Type 2 diabetes mellitus with hyperglycemia, without long-term current use of insulin (HCC) (Primary)  -     CBC & Differential  -     Comprehensive Metabolic Panel  -     Hemoglobin A1c  -     MicroAlbumin, Urine, Random - Urine, Clean Catch    2. 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 Anxiety  -     Ambulatory Referral to Psychiatry    4. Mild intermittent asthma, unspecified whether complicated  -     CBC & Differential  -     Comprehensive Metabolic Panel    Other orders  -     Tirzepatide (Mounjaro) 2.5 MG/0.5ML solution pen-injector; Inject 0.5 mL under the skin into the appropriate area as directed 1 (One) Time Per Week for 30 days.  Dispense: 2 mL; Refill: 2      We will try and prescribed Mounjaro instead of Ozempic to see if pharmacies will carry.  I imagine A1c is not improved with not able to take Ozempic as prescribed.  Will check the following labs.  Consult psychiatry for further evaluation and treatment for her depression anxiety.  At this time she declines medication and would like to see a counselor.  Asthma is well controlled.    Class 3 Severe Obesity (BMI >=40). Obesity-related health conditions include the following: diabetes mellitus and dyslipidemias. Obesity is unchanged. BMI is is above average; BMI management plan is completed. We discussed portion control and increasing  exercise.        Follow Up   Return in about 3 months (around 5/28/2023), or if symptoms worsen or fail to improve, for Next scheduled follow up.  Patient was given instructions and counseling regarding her condition or for health maintenance advice. Please see specific information pulled into the AVS if appropriate.     Please note that portions of this note were completed with a voice recognition program.

## 2023-03-03 ENCOUNTER — TELEPHONE (OUTPATIENT)
Dept: FAMILY MEDICINE CLINIC | Facility: CLINIC | Age: 28
End: 2023-03-03
Payer: COMMERCIAL

## 2023-03-03 RX ORDER — SEMAGLUTIDE 1.34 MG/ML
INJECTION, SOLUTION SUBCUTANEOUS
Qty: 3 ML | Refills: 2 | Status: SHIPPED | OUTPATIENT
Start: 2023-03-03

## 2023-03-03 NOTE — TELEPHONE ENCOUNTER
Spoke with patient and let her know her insurance will not pay for Mounjaro until she has tried and failed 3 formulary medications. So Kevin sent in the Ozempic again that she was on previously. Patient verbalized understanding.

## 2023-03-16 RX ORDER — SITAGLIPTIN 50 MG/1
TABLET, FILM COATED ORAL
COMMUNITY
Start: 2023-02-21

## 2023-04-06 ENCOUNTER — OFFICE VISIT (OUTPATIENT)
Dept: BEHAVIORAL HEALTH | Facility: CLINIC | Age: 28
End: 2023-04-06
Payer: COMMERCIAL

## 2023-04-06 VITALS
WEIGHT: 256 LBS | SYSTOLIC BLOOD PRESSURE: 124 MMHG | HEIGHT: 65 IN | HEART RATE: 83 BPM | BODY MASS INDEX: 42.65 KG/M2 | DIASTOLIC BLOOD PRESSURE: 65 MMHG

## 2023-04-06 DIAGNOSIS — F43.10 POST TRAUMATIC STRESS DISORDER (PTSD): ICD-10-CM

## 2023-04-06 DIAGNOSIS — F41.1 GENERALIZED ANXIETY DISORDER: ICD-10-CM

## 2023-04-06 DIAGNOSIS — F41.0 PANIC DISORDER: ICD-10-CM

## 2023-04-06 DIAGNOSIS — F33.2 SEVERE EPISODE OF RECURRENT MAJOR DEPRESSIVE DISORDER, WITHOUT PSYCHOTIC FEATURES: Primary | ICD-10-CM

## 2023-04-06 PROCEDURE — 90792 PSYCH DIAG EVAL W/MED SRVCS: CPT | Performed by: PHYSICIAN ASSISTANT

## 2023-04-06 RX ORDER — ESCITALOPRAM OXALATE 10 MG/1
TABLET ORAL
Qty: 30 TABLET | Refills: 1 | Status: SHIPPED | OUTPATIENT
Start: 2023-04-06

## 2023-04-06 NOTE — PROGRESS NOTES
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 Psychotherapy  -     escitalopram (Lexapro) 10 MG tablet; Take 0.5 tab PO QHS x 1 week, if tolerated can increase to 1 tab PO QHS  Dispense: 30 tablet; Refill: 1    3. Panic disorder  -     Ambulatory Referral to Psychotherapy  -     escitalopram (Lexapro) 10 MG tablet; Take 0.5 tab PO QHS x 1 week, if tolerated can increase to 1 tab PO QHS  Dispense: 30 tablet; Refill: 1    4. Post traumatic stress disorder (PTSD)  -     Ambulatory Referral to Psychotherapy  -     escitalopram (Lexapro) 10 MG tablet; Take 0.5 tab PO QHS x 1 week, if tolerated can increase to 1 tab PO QHS  Dispense: 30 tablet; Refill: 1        Visit Diagnoses:    ICD-10-CM ICD-9-CM   1. Severe episode of recurrent major depressive disorder, without psychotic features  F33.2 296.33   2. Generalized anxiety disorder  F41.1 300.02   3. Panic disorder  F41.0 300.01   4. Post traumatic stress disorder (PTSD)  F43.10 309.81       PLAN:  1. Safety: No acute safety concerns at this time.  2. Therapy: Will refer for psychotherapy to [facility]  3. Risk Assessment: Risk of self-harm acutely is moderate to severe.  Risk factors include anxiety disorder, mood disorder, present SI (constant fleeting, no plan or intent), and recent psychosocial stressors (pandemic). Protective factors include no family history, denies access to  guns/weapons, no history of suicide attempts or self-harm in the past, minimal AODA, healthcare seeking, future orientation, willingness to engage in care.  Risk of self-harm chronically is also moderate to severe, but could be further elevated in the event of treatment noncompliance and/or AODA.  4. Labs/Diagnostics Ordered:   Orders Placed This Encounter   Procedures   • Ambulatory Referral to Psychotherapy     5. Medications:   New Medications Ordered This Visit   Medications   • escitalopram (Lexapro) 10 MG tablet     Sig: Take 0.5 tab PO QHS x 1 week, if tolerated can increase to 1 tab PO QHS     Dispense:  30 tablet     Refill:  1       Discussed all risks, benefits, alternatives, and side effects of Escitalopram including but not limited to GI upset, sexual dysfunction, bleeding risk, seizure risk, insomnia, sedation, headache, activation of zenon or hypomania, increased fragility fracture risk, hyponatremia, ocular effects, dose-dependent prolonged QT interval, withdrawal syndrome following abrupt discontinuation, serotonin syndrome, and activation of suicidal ideation and behavior. Pt educated on the need to practice safe sex while taking this med. Discussed the need for pt to immediately call the office for any new or worsening symptoms, such as worsening depression; feeling nervous or restless; suicidal thoughts or actions; or other changes changes in mood or behavior, and all other concerns. Pt educated on med compliance and the risks of suddenly stopping this medication or missing doses. Pt verbalized understanding and is agreeable to taking Escitalopram. Addressed all questions and concerns.     6. Follow up:   F/u in 1 month.    TREATMENT PLAN/GOALS: Continue supportive psychotherapy efforts and medications as indicated. Treatment and medication options discussed during today's visit. Patient ackowledged and verbally consented to continue with current treatment plan and was educated on the importance  of compliance with treatment and follow-up appointments.    MEDICATION ISSUES:  AUSTYN reviewed as expected.  Discussed medication options and treatment plan of prescribed medication as well as the risks, benefits, and side effects including potential falls, possible impaired driving and metabolic adversities among others. Patient is agreeable to call the office with any worsening of symptoms or onset of side effects. Patient is agreeable to call 911 or go to the nearest ER should he/she begin having SI/HI. No medication side effects or related complaints today.            This document has been electronically signed by Sary Rodríguez PA-C  April 6, 2023 09:36 EDT      Part of this note may be an electronic transcription/translation of spoken language to printed text using the Dragon Dictation System.

## 2023-05-22 DIAGNOSIS — E11.65 TYPE 2 DIABETES MELLITUS WITH HYPERGLYCEMIA, WITHOUT LONG-TERM CURRENT USE OF INSULIN: ICD-10-CM

## 2023-05-22 RX ORDER — SITAGLIPTIN 50 MG/1
TABLET, FILM COATED ORAL
Qty: 90 TABLET | Refills: 0 | Status: SHIPPED | OUTPATIENT
Start: 2023-05-22

## 2023-05-22 RX ORDER — DAPAGLIFLOZIN 10 MG/1
TABLET, FILM COATED ORAL
Qty: 90 TABLET | Refills: 0 | Status: SHIPPED | OUTPATIENT
Start: 2023-05-22

## 2023-05-24 ENCOUNTER — TELEMEDICINE (OUTPATIENT)
Dept: PSYCHIATRY | Facility: CLINIC | Age: 28
End: 2023-05-24
Payer: COMMERCIAL

## 2023-05-24 DIAGNOSIS — F41.1 GENERALIZED ANXIETY DISORDER: ICD-10-CM

## 2023-05-24 DIAGNOSIS — F43.10 POST TRAUMATIC STRESS DISORDER (PTSD): ICD-10-CM

## 2023-05-24 DIAGNOSIS — F41.0 PANIC DISORDER: ICD-10-CM

## 2023-05-24 DIAGNOSIS — F33.2 SEVERE EPISODE OF RECURRENT MAJOR DEPRESSIVE DISORDER, WITHOUT PSYCHOTIC FEATURES: ICD-10-CM

## 2023-05-24 RX ORDER — ESCITALOPRAM OXALATE 10 MG/1
15 TABLET ORAL DAILY
Qty: 30 TABLET | Refills: 1 | Status: SHIPPED | OUTPATIENT
Start: 2023-05-24 | End: 2023-06-23
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
## 2023-05-30 ENCOUNTER — OFFICE VISIT (OUTPATIENT)
Dept: FAMILY MEDICINE CLINIC | Facility: CLINIC | Age: 28
End: 2023-05-30

## 2023-05-30 VITALS
DIASTOLIC BLOOD PRESSURE: 76 MMHG | HEART RATE: 88 BPM | HEIGHT: 65 IN | WEIGHT: 255.2 LBS | SYSTOLIC BLOOD PRESSURE: 124 MMHG | TEMPERATURE: 99.1 F | OXYGEN SATURATION: 96 % | BODY MASS INDEX: 42.52 KG/M2

## 2023-05-30 DIAGNOSIS — E78.5 HYPERLIPIDEMIA LDL GOAL <70: ICD-10-CM

## 2023-05-30 DIAGNOSIS — J45.20 MILD INTERMITTENT ASTHMA, UNSPECIFIED WHETHER COMPLICATED: ICD-10-CM

## 2023-05-30 DIAGNOSIS — E11.65 TYPE 2 DIABETES MELLITUS WITH HYPERGLYCEMIA, WITHOUT LONG-TERM CURRENT USE OF INSULIN: Primary | ICD-10-CM

## 2023-05-30 LAB
ALBUMIN SERPL-MCNC: 4.5 G/DL (ref 3.5–5.2)
ALBUMIN/GLOB SERPL: 1.8 G/DL
ALP SERPL-CCNC: 73 U/L (ref 39–117)
ALT SERPL W P-5'-P-CCNC: 29 U/L (ref 1–33)
ANION GAP SERPL CALCULATED.3IONS-SCNC: 13.5 MMOL/L (ref 5–15)
AST SERPL-CCNC: 19 U/L (ref 1–32)
BASOPHILS # BLD AUTO: 0.04 10*3/MM3 (ref 0–0.2)
BASOPHILS NFR BLD AUTO: 0.6 % (ref 0–1.5)
BILIRUB SERPL-MCNC: 0.3 MG/DL (ref 0–1.2)
BUN SERPL-MCNC: 11 MG/DL (ref 6–20)
BUN/CREAT SERPL: 15.5 (ref 7–25)
CALCIUM SPEC-SCNC: 9.6 MG/DL (ref 8.6–10.5)
CHLORIDE SERPL-SCNC: 103 MMOL/L (ref 98–107)
CHOLEST SERPL-MCNC: 195 MG/DL (ref 0–200)
CO2 SERPL-SCNC: 24.5 MMOL/L (ref 22–29)
CREAT SERPL-MCNC: 0.71 MG/DL (ref 0.57–1)
DEPRECATED RDW RBC AUTO: 38.9 FL (ref 37–54)
EGFRCR SERPLBLD CKD-EPI 2021: 118.9 ML/MIN/1.73
EOSINOPHIL # BLD AUTO: 0.16 10*3/MM3 (ref 0–0.4)
EOSINOPHIL NFR BLD AUTO: 2.4 % (ref 0.3–6.2)
ERYTHROCYTE [DISTWIDTH] IN BLOOD BY AUTOMATED COUNT: 12.3 % (ref 12.3–15.4)
GLOBULIN UR ELPH-MCNC: 2.5 GM/DL
GLUCOSE SERPL-MCNC: 109 MG/DL (ref 65–99)
HBA1C MFR BLD: 6.9 % (ref 4.8–5.6)
HCT VFR BLD AUTO: 41 % (ref 34–46.6)
HDLC SERPL-MCNC: 31 MG/DL (ref 40–60)
HGB BLD-MCNC: 13.4 G/DL (ref 12–15.9)
IMM GRANULOCYTES # BLD AUTO: 0.02 10*3/MM3 (ref 0–0.05)
IMM GRANULOCYTES NFR BLD AUTO: 0.3 % (ref 0–0.5)
LDLC SERPL CALC-MCNC: 124 MG/DL (ref 0–100)
LDLC/HDLC SERPL: 3.84 {RATIO}
LYMPHOCYTES # BLD AUTO: 2.56 10*3/MM3 (ref 0.7–3.1)
LYMPHOCYTES NFR BLD AUTO: 38.4 % (ref 19.6–45.3)
MCH RBC QN AUTO: 28.3 PG (ref 26.6–33)
MCHC RBC AUTO-ENTMCNC: 32.7 G/DL (ref 31.5–35.7)
MCV RBC AUTO: 86.7 FL (ref 79–97)
MONOCYTES # BLD AUTO: 0.48 10*3/MM3 (ref 0.1–0.9)
MONOCYTES NFR BLD AUTO: 7.2 % (ref 5–12)
NEUTROPHILS NFR BLD AUTO: 3.41 10*3/MM3 (ref 1.7–7)
NEUTROPHILS NFR BLD AUTO: 51.1 % (ref 42.7–76)
NRBC BLD AUTO-RTO: 0 /100 WBC (ref 0–0.2)
PLATELET # BLD AUTO: 353 10*3/MM3 (ref 140–450)
PMV BLD AUTO: 11 FL (ref 6–12)
POTASSIUM SERPL-SCNC: 4.4 MMOL/L (ref 3.5–5.2)
PROT SERPL-MCNC: 7 G/DL (ref 6–8.5)
RBC # BLD AUTO: 4.73 10*6/MM3 (ref 3.77–5.28)
SODIUM SERPL-SCNC: 141 MMOL/L (ref 136–145)
TRIGL SERPL-MCNC: 225 MG/DL (ref 0–150)
VLDLC SERPL-MCNC: 40 MG/DL (ref 5–40)
WBC NRBC COR # BLD: 6.67 10*3/MM3 (ref 3.4–10.8)

## 2023-05-30 PROCEDURE — 99214 OFFICE O/P EST MOD 30 MIN: CPT | Performed by: NURSE PRACTITIONER

## 2023-05-30 PROCEDURE — 85025 COMPLETE CBC W/AUTO DIFF WBC: CPT | Performed by: NURSE PRACTITIONER

## 2023-05-30 PROCEDURE — 83036 HEMOGLOBIN GLYCOSYLATED A1C: CPT | Performed by: NURSE PRACTITIONER

## 2023-05-30 PROCEDURE — 80061 LIPID PANEL: CPT | Performed by: NURSE PRACTITIONER

## 2023-05-30 PROCEDURE — 80053 COMPREHEN METABOLIC PANEL: CPT | Performed by: NURSE PRACTITIONER

## 2023-05-30 RX ORDER — SEMAGLUTIDE 1.34 MG/ML
0.5 INJECTION, SOLUTION SUBCUTANEOUS WEEKLY
Qty: 3 ML | Refills: 2 | Status: SHIPPED | OUTPATIENT
Start: 2023-05-30

## 2023-05-30 NOTE — PROGRESS NOTES
"Chief Complaint  Diabetes, Depression, and Asthma    Subjective         DesMjose jque Mell Isaac presents to [facility]  HPI   Presents today for 3-month follow-up on uncontrolled type 2 diabetes.  Since starting her on Ozempic her blood sugars have decreased to low 100s.  She has noted she has tolerated the medication well.  As noted that she also has a decrease in appetite.  She has lost 6 pounds in the past 3 months.  She also has hyperlipidemia.    Social History     Socioeconomic History   • Marital status: Single   Tobacco Use   • Smoking status: Never   • Smokeless tobacco: Never   Vaping Use   • Vaping Use: Never used   Substance and Sexual Activity   • Alcohol use: Yes     Comment: current some day    • Drug use: Never   • Sexual activity: Not Currently     Partners: Male     Birth control/protection: Condom, None        Objective     Vitals:    05/30/23 1037   BP: 124/76   BP Location: Left arm   Patient Position: Sitting   Cuff Size: Adult   Pulse: 88   Temp: 99.1 °F (37.3 °C)   TempSrc: Oral   SpO2: 96%   Weight: 116 kg (255 lb 3.2 oz)   Height: 165.1 cm (65\")        Body mass index is 42.47 kg/m².    Wt Readings from Last 3 Encounters:   05/30/23 116 kg (255 lb 3.2 oz)   04/06/23 116 kg (256 lb)   03/25/23 118 kg (261 lb)       BP Readings from Last 3 Encounters:   05/30/23 124/76   04/06/23 124/65   03/25/23 123/64         Physical Exam  Vitals reviewed.   Constitutional:       Appearance: Normal appearance. She is morbidly obese.   HENT:      Head: Normocephalic and atraumatic.      Right Ear: External ear normal.      Left Ear: External ear normal.      Mouth/Throat:      Pharynx: No oropharyngeal exudate.   Eyes:      Conjunctiva/sclera: Conjunctivae normal.      Pupils: Pupils are equal, round, and reactive to light.   Cardiovascular:      Rate and Rhythm: Normal rate and regular rhythm.      Heart sounds: No murmur heard.    No friction rub. No gallop.   Pulmonary: "      Effort: Pulmonary effort is normal.      Breath sounds: Normal breath sounds. No wheezing or rhonchi.   Skin:     General: Skin is warm and dry.   Neurological:      Mental Status: She is alert and oriented to person, place, and time.   Psychiatric:         Mood and Affect: Mood and affect normal.         Behavior: Behavior normal.         Thought Content: Thought content normal.         Judgment: Judgment normal.          Result Review :   The following data was reviewed by: PERLA Chris on 05/30/2023:  Common labs        8/25/2022    15:25 11/29/2022    08:57 2/28/2023    09:45   Common Labs   Glucose 133   131   122     BUN 16   9   12     Creatinine 0.56   0.56   0.60     Sodium 140   140   139     Potassium 4.3   4.0   4.4     Chloride 104   102   104     Calcium 10.0   9.4   10.1     Albumin 4.80   4.80   5.3     Total Bilirubin 0.2   0.4   0.4     Alkaline Phosphatase 71   64   73     AST (SGOT) 26   24   24     ALT (SGPT) 42   43   41     WBC 6.69   5.64   6.77     Hemoglobin 13.3   13.4   14.7     Hematocrit 40.7   41.7   44.4     Platelets 376   366   336     Total Cholesterol  199      Triglycerides  188      HDL Cholesterol  36      LDL Cholesterol   129      Hemoglobin A1C 7.10   6.70   7.70     Microalbumin, Urine   1.7         Procedures    Assessment and Plan   Diagnoses and all orders for this visit:    1. Type 2 diabetes mellitus with hyperglycemia, without long-term current use of insulin (Primary)  -     Semaglutide,0.25 or 0.5MG/DOS, (Ozempic, 0.25 or 0.5 MG/DOSE,) 2 MG/1.5ML solution pen-injector; Inject 0.5 mg under the skin into the appropriate area as directed 1 (One) Time Per Week. 0.25 mg weekly x 4 weeks, then 0.5 mg week thereafter  Dispense: 3 mL; Refill: 2  -     CBC & Differential  -     Comprehensive Metabolic Panel  -     Hemoglobin A1c    2. Mild intermittent asthma, unspecified whether complicated    3. Hyperlipidemia LDL goal <70  -     Lipid Panel    Per home blood  sugar readings her A1c should improve.  She is tolerating Ozempic well.  Has had some weight loss of 6 pounds in the past 3 months.  We will continue the Ozempic.  Stop the Januvia since she is currently taking Ozempic.  Continue the Farxiga      Class 3 Severe Obesity (BMI >=40). Obesity-related health conditions include the following: diabetes mellitus and dyslipidemias. Obesity is improving with lifestyle modifications. BMI is is above average; BMI management plan is completed. We discussed portion control and increasing exercise.        Follow Up   Return in about 3 months (around 8/30/2023), or if symptoms worsen or fail to improve, for Next scheduled follow up.  Patient was given instructions and counseling regarding her condition or for health maintenance advice. Please see specific information pulled into the AVS if appropriate.     Please note that portions of this note were completed with a voice recognition program.

## 2023-06-05 DIAGNOSIS — J45.20 MILD INTERMITTENT ASTHMA, UNSPECIFIED WHETHER COMPLICATED: ICD-10-CM

## 2023-06-05 RX ORDER — ATORVASTATIN CALCIUM 40 MG/1
40 TABLET, FILM COATED ORAL NIGHTLY
Qty: 90 TABLET | Refills: 1 | Status: SHIPPED | OUTPATIENT
Start: 2023-06-05

## 2023-08-30 ENCOUNTER — OFFICE VISIT (OUTPATIENT)
Dept: FAMILY MEDICINE CLINIC | Facility: CLINIC | Age: 28
End: 2023-08-30
Payer: COMMERCIAL

## 2023-08-30 VITALS
OXYGEN SATURATION: 97 % | TEMPERATURE: 98.3 F | SYSTOLIC BLOOD PRESSURE: 104 MMHG | WEIGHT: 255.8 LBS | HEIGHT: 65 IN | DIASTOLIC BLOOD PRESSURE: 62 MMHG | HEART RATE: 96 BPM | BODY MASS INDEX: 42.62 KG/M2

## 2023-08-30 DIAGNOSIS — E11.65 TYPE 2 DIABETES MELLITUS WITH HYPERGLYCEMIA, WITHOUT LONG-TERM CURRENT USE OF INSULIN: Primary | ICD-10-CM

## 2023-08-30 DIAGNOSIS — F33.0 MILD EPISODE OF RECURRENT MAJOR DEPRESSIVE DISORDER: ICD-10-CM

## 2023-08-30 DIAGNOSIS — J45.20 MILD INTERMITTENT ASTHMA, UNSPECIFIED WHETHER COMPLICATED: ICD-10-CM

## 2023-08-30 DIAGNOSIS — E66.01 CLASS 3 SEVERE OBESITY DUE TO EXCESS CALORIES WITH SERIOUS COMORBIDITY AND BODY MASS INDEX (BMI) OF 40.0 TO 44.9 IN ADULT: ICD-10-CM

## 2023-08-30 LAB
25(OH)D3 SERPL-MCNC: 14.4 NG/ML (ref 30–100)
ALBUMIN SERPL-MCNC: 5.1 G/DL (ref 3.5–5.2)
ALBUMIN/GLOB SERPL: 2 G/DL
ALP SERPL-CCNC: 68 U/L (ref 39–117)
ALT SERPL W P-5'-P-CCNC: 34 U/L (ref 1–33)
ANION GAP SERPL CALCULATED.3IONS-SCNC: 14 MMOL/L (ref 5–15)
AST SERPL-CCNC: 23 U/L (ref 1–32)
BILIRUB SERPL-MCNC: 0.6 MG/DL (ref 0–1.2)
BUN SERPL-MCNC: 10 MG/DL (ref 6–20)
BUN/CREAT SERPL: 18.2 (ref 7–25)
CALCIUM SPEC-SCNC: 9.8 MG/DL (ref 8.6–10.5)
CHLORIDE SERPL-SCNC: 101 MMOL/L (ref 98–107)
CO2 SERPL-SCNC: 26 MMOL/L (ref 22–29)
CREAT SERPL-MCNC: 0.55 MG/DL (ref 0.57–1)
EGFRCR SERPLBLD CKD-EPI 2021: 128.2 ML/MIN/1.73
GLOBULIN UR ELPH-MCNC: 2.5 GM/DL
GLUCOSE SERPL-MCNC: 102 MG/DL (ref 65–99)
HBA1C MFR BLD: 7.2 % (ref 4.8–5.6)
POTASSIUM SERPL-SCNC: 3.9 MMOL/L (ref 3.5–5.2)
PROT SERPL-MCNC: 7.6 G/DL (ref 6–8.5)
SODIUM SERPL-SCNC: 141 MMOL/L (ref 136–145)

## 2023-08-30 PROCEDURE — 80053 COMPREHEN METABOLIC PANEL: CPT | Performed by: NURSE PRACTITIONER

## 2023-08-30 PROCEDURE — 36415 COLL VENOUS BLD VENIPUNCTURE: CPT | Performed by: NURSE PRACTITIONER

## 2023-08-30 PROCEDURE — 99214 OFFICE O/P EST MOD 30 MIN: CPT | Performed by: NURSE PRACTITIONER

## 2023-08-30 PROCEDURE — 83036 HEMOGLOBIN GLYCOSYLATED A1C: CPT | Performed by: NURSE PRACTITIONER

## 2023-08-30 PROCEDURE — 82306 VITAMIN D 25 HYDROXY: CPT | Performed by: NURSE PRACTITIONER

## 2023-08-30 RX ORDER — DAPAGLIFLOZIN 10 MG/1
1 TABLET, FILM COATED ORAL DAILY
Qty: 90 TABLET | Refills: 3 | Status: SHIPPED | OUTPATIENT
Start: 2023-08-30

## 2023-08-30 NOTE — PROGRESS NOTES
"Chief Complaint  Asthma, Diabetes, and Med Refill    Subjective         DesMonique Mell Isaac presents to [facility]  HPI   Presents today for follow-up on type 2 diabetes, depression anxiety, and hyperlipidemia.  Overall patient states she is doing well.  Reports her blood sugars are approximately 130.  She has not lost any weight.    Social History     Socioeconomic History    Marital status: Single   Tobacco Use    Smoking status: Never    Smokeless tobacco: Never   Vaping Use    Vaping Use: Never used   Substance and Sexual Activity    Alcohol use: Yes     Comment: current some day     Drug use: Never    Sexual activity: Not Currently     Partners: Male     Birth control/protection: Condom, None        Objective     Vitals:    08/30/23 1308   BP: 104/62   Pulse: 96   Temp: 98.3 øF (36.8 øC)   SpO2: 97%   Weight: 116 kg (255 lb 12.8 oz)   Height: 165.1 cm (65\")        Body mass index is 42.57 kg/mý.    Wt Readings from Last 3 Encounters:   08/30/23 116 kg (255 lb 12.8 oz)   06/23/23 114 kg (251 lb)   05/30/23 116 kg (255 lb 3.2 oz)       BP Readings from Last 3 Encounters:   08/30/23 104/62   06/23/23 130/78   05/30/23 124/76         Physical Exam  Vitals reviewed.   Constitutional:       Appearance: Normal appearance. She is well-developed.   HENT:      Head: Normocephalic and atraumatic.      Right Ear: External ear normal.      Left Ear: External ear normal.      Mouth/Throat:      Pharynx: No oropharyngeal exudate.   Eyes:      Conjunctiva/sclera: Conjunctivae normal.      Pupils: Pupils are equal, round, and reactive to light.   Cardiovascular:      Rate and Rhythm: Normal rate and regular rhythm.      Heart sounds: No murmur heard.    No friction rub. No gallop.   Pulmonary:      Effort: Pulmonary effort is normal.      Breath sounds: Normal breath sounds. No wheezing or rhonchi.   Skin:     General: Skin is warm and dry.   Neurological:      Mental Status: She is alert " and oriented to person, place, and time.   Psychiatric:         Mood and Affect: Mood and affect normal.         Behavior: Behavior normal.         Thought Content: Thought content normal.         Judgment: Judgment normal.        Result Review :   The following data was reviewed by: PERLA Chris on 08/30/2023:  Common labs          11/29/2022    08:57 2/28/2023    09:45 5/30/2023    11:25   Common Labs   Glucose 131  122  109    BUN 9  12  11    Creatinine 0.56  0.60  0.71    Sodium 140  139  141    Potassium 4.0  4.4  4.4    Chloride 102  104  103    Calcium 9.4  10.1  9.6    Albumin 4.80  5.3  4.5    Total Bilirubin 0.4  0.4  0.3    Alkaline Phosphatase 64  73  73    AST (SGOT) 24  24  19    ALT (SGPT) 43  41  29    WBC 5.64  6.77  6.67    Hemoglobin 13.4  14.7  13.4    Hematocrit 41.7  44.4  41.0    Platelets 366  336  353    Total Cholesterol 199   195    Triglycerides 188   225    HDL Cholesterol 36   31    LDL Cholesterol  129   124    Hemoglobin A1C 6.70  7.70  6.90    Microalbumin, Urine  1.7       A1C Last 3 Results          11/29/2022    08:57 2/28/2023    09:45 5/30/2023    11:25   HGBA1C Last 3 Results   Hemoglobin A1C 6.70  7.70  6.90      Microalbumin          2/28/2023    09:45   Microalbumin   Microalbumin, Urine 1.7        Procedures    Assessment and Plan   Diagnoses and all orders for this visit:    1. Type 2 diabetes mellitus with hyperglycemia, without long-term current use of insulin (Primary)  -     Hemoglobin A1c  -     Comprehensive Metabolic Panel  -     Semaglutide, 1 MG/DOSE, (OZEMPIC) 4 MG/3ML solution pen-injector; Inject 1 mg under the skin into the appropriate area as directed 1 (One) Time Per Week.  Dispense: 9 mL; Refill: 3  -     dapagliflozin Propanediol (Farxiga) 10 MG tablet; Take 10 mg by mouth Daily.  Dispense: 90 tablet; Refill: 3    2. Class 3 severe obesity due to excess calories with serious comorbidity and body mass index (BMI) of 40.0 to 44.9 in adult  -      Vitamin D,25-Hydroxy    3. Mild intermittent asthma, unspecified whether complicated    4. Mild episode of recurrent major depressive disorder    Increase Ozempic to 1 mg weekly.  Check vitamin D.  We will also check a CBC and A1c diabetes is fairly controlled.  With the increase in Ozempic hopefully will help better control the blood sugars and promote weight loss.               Follow Up   Return in about 3 months (around 11/30/2023), or if symptoms worsen or fail to improve, for Next scheduled follow up.  Patient was given instructions and counseling regarding her condition or for health maintenance advice. Please see specific information pulled into the AVS if appropriate.     Please note that portions of this note were completed with a voice recognition program.

## 2023-08-31 RX ORDER — ERGOCALCIFEROL 1.25 MG/1
50000 CAPSULE ORAL WEEKLY
Qty: 13 CAPSULE | Refills: 3 | Status: SHIPPED | OUTPATIENT
Start: 2023-08-31

## 2023-08-31 NOTE — PROGRESS NOTES
Called and spoke to Analisa Isaac to relay results and Rx update. Pt verbalized understanding. No further questions/concerns at this time.

## 2023-10-25 ENCOUNTER — OFFICE VISIT (OUTPATIENT)
Dept: FAMILY MEDICINE CLINIC | Facility: CLINIC | Age: 28
End: 2023-10-25
Payer: COMMERCIAL

## 2023-10-25 VITALS
HEART RATE: 84 BPM | DIASTOLIC BLOOD PRESSURE: 80 MMHG | TEMPERATURE: 97.7 F | BODY MASS INDEX: 42.49 KG/M2 | SYSTOLIC BLOOD PRESSURE: 126 MMHG | WEIGHT: 255 LBS | OXYGEN SATURATION: 99 % | HEIGHT: 65 IN

## 2023-10-25 DIAGNOSIS — E11.9 TYPE 2 DIABETES MELLITUS WITHOUT COMPLICATION, WITHOUT LONG-TERM CURRENT USE OF INSULIN: Primary | ICD-10-CM

## 2023-10-25 DIAGNOSIS — S30.861S TICK BITE OF ABDOMEN, SEQUELA: ICD-10-CM
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
## 2023-10-25 DIAGNOSIS — E55.9 VITAMIN D DEFICIENCY: ICD-10-CM

## 2023-10-25 DIAGNOSIS — F32.2 CURRENT SEVERE EPISODE OF MAJOR DEPRESSIVE DISORDER WITHOUT PSYCHOTIC FEATURES, UNSPECIFIED WHETHER RECURRENT: ICD-10-CM

## 2023-10-25 DIAGNOSIS — Z23 NEED FOR INFLUENZA VACCINATION: ICD-10-CM

## 2023-10-25 DIAGNOSIS — E78.5 HYPERLIPIDEMIA, UNSPECIFIED HYPERLIPIDEMIA TYPE: ICD-10-CM

## 2023-10-25 LAB
ALBUMIN SERPL-MCNC: 4.5 G/DL (ref 3.5–5.2)
ALBUMIN/GLOB SERPL: 1.7 G/DL
ALP SERPL-CCNC: 56 U/L (ref 39–117)
ALT SERPL W P-5'-P-CCNC: 31 U/L (ref 1–33)
ANION GAP SERPL CALCULATED.3IONS-SCNC: 8.8 MMOL/L (ref 5–15)
AST SERPL-CCNC: 24 U/L (ref 1–32)
BASOPHILS # BLD AUTO: 0.05 10*3/MM3 (ref 0–0.2)
BASOPHILS NFR BLD AUTO: 0.8 % (ref 0–1.5)
BILIRUB SERPL-MCNC: 0.5 MG/DL (ref 0–1.2)
BUN SERPL-MCNC: 9 MG/DL (ref 6–20)
BUN/CREAT SERPL: 14.1 (ref 7–25)
CALCIUM SPEC-SCNC: 9.3 MG/DL (ref 8.6–10.5)
CHLORIDE SERPL-SCNC: 104 MMOL/L (ref 98–107)
CHOLEST SERPL-MCNC: 169 MG/DL (ref 0–200)
CO2 SERPL-SCNC: 28.2 MMOL/L (ref 22–29)
CREAT SERPL-MCNC: 0.64 MG/DL (ref 0.57–1)
DEPRECATED RDW RBC AUTO: 39.8 FL (ref 37–54)
EGFRCR SERPLBLD CKD-EPI 2021: 122.9 ML/MIN/1.73
EOSINOPHIL # BLD AUTO: 0.17 10*3/MM3 (ref 0–0.4)
EOSINOPHIL NFR BLD AUTO: 2.8 % (ref 0.3–6.2)
ERYTHROCYTE [DISTWIDTH] IN BLOOD BY AUTOMATED COUNT: 12.3 % (ref 12.3–15.4)
GLOBULIN UR ELPH-MCNC: 2.7 GM/DL
GLUCOSE SERPL-MCNC: 114 MG/DL (ref 65–99)
HBA1C MFR BLD: 6.8 % (ref 4.8–5.6)
HCT VFR BLD AUTO: 40.4 % (ref 34–46.6)
HDLC SERPL-MCNC: 33 MG/DL (ref 40–60)
HGB BLD-MCNC: 13.6 G/DL (ref 12–15.9)
IMM GRANULOCYTES # BLD AUTO: 0.02 10*3/MM3 (ref 0–0.05)
IMM GRANULOCYTES NFR BLD AUTO: 0.3 % (ref 0–0.5)
LDLC SERPL CALC-MCNC: 114 MG/DL (ref 0–100)
LDLC/HDLC SERPL: 3.39 {RATIO}
LYMPHOCYTES # BLD AUTO: 2.42 10*3/MM3 (ref 0.7–3.1)
LYMPHOCYTES NFR BLD AUTO: 40.2 % (ref 19.6–45.3)
MCH RBC QN AUTO: 29.8 PG (ref 26.6–33)
MCHC RBC AUTO-ENTMCNC: 33.7 G/DL (ref 31.5–35.7)
MCV RBC AUTO: 88.4 FL (ref 79–97)
MONOCYTES # BLD AUTO: 0.36 10*3/MM3 (ref 0.1–0.9)
MONOCYTES NFR BLD AUTO: 6 % (ref 5–12)
NEUTROPHILS NFR BLD AUTO: 3 10*3/MM3 (ref 1.7–7)
NEUTROPHILS NFR BLD AUTO: 49.9 % (ref 42.7–76)
NRBC BLD AUTO-RTO: 0 /100 WBC (ref 0–0.2)
PLATELET # BLD AUTO: 357 10*3/MM3 (ref 140–450)
PMV BLD AUTO: 11.2 FL (ref 6–12)
POTASSIUM SERPL-SCNC: 4.2 MMOL/L (ref 3.5–5.2)
PROT SERPL-MCNC: 7.2 G/DL (ref 6–8.5)
RBC # BLD AUTO: 4.57 10*6/MM3 (ref 3.77–5.28)
SODIUM SERPL-SCNC: 141 MMOL/L (ref 136–145)
TRIGL SERPL-MCNC: 121 MG/DL (ref 0–150)
VLDLC SERPL-MCNC: 22 MG/DL (ref 5–40)
WBC NRBC COR # BLD: 6.02 10*3/MM3 (ref 3.4–10.8)

## 2023-10-25 PROCEDURE — 85025 COMPLETE CBC W/AUTO DIFF WBC: CPT | Performed by: STUDENT IN AN ORGANIZED HEALTH CARE EDUCATION/TRAINING PROGRAM

## 2023-10-25 PROCEDURE — 80053 COMPREHEN METABOLIC PANEL: CPT | Performed by: STUDENT IN AN ORGANIZED HEALTH CARE EDUCATION/TRAINING PROGRAM

## 2023-10-25 PROCEDURE — 83036 HEMOGLOBIN GLYCOSYLATED A1C: CPT | Performed by: STUDENT IN AN ORGANIZED HEALTH CARE EDUCATION/TRAINING PROGRAM

## 2023-10-25 PROCEDURE — 80061 LIPID PANEL: CPT | Performed by: STUDENT IN AN ORGANIZED HEALTH CARE EDUCATION/TRAINING PROGRAM

## 2023-10-25 RX ORDER — ESCITALOPRAM OXALATE 20 MG/1
20 TABLET ORAL DAILY
Qty: 90 TABLET | Refills: 2 | Status: SHIPPED | OUTPATIENT
Start: 2023-10-25 | End: 2024-01-23

## 2023-10-25 RX ORDER — ESCITALOPRAM OXALATE 20 MG/1
20 TABLET ORAL DAILY
Qty: 90 TABLET | Refills: 2 | Status: SHIPPED | OUTPATIENT
Start: 2023-10-25 | End: 2023-10-25 | Stop reason: SDUPTHER

## 2023-10-25 NOTE — PROGRESS NOTES
"Chief Complaint  Tick Removal (Tick bite)    Subjective      History of Present Illness  Analisa Isaac is a 28 y.o. female who presents to [facility] with a past medical history of  Past Medical History:   Diagnosis Date    Asthma 10/19/2020    Diabetes mellitus     Left knee pain 10/19/2020   Pt presents to establish care with me today, she states she had a tick bite 3 weeks ago and she is currently on doxycycline and prescribed to her by urgent care.  Patient states that the tick was on her body for an unknown amount of time but she believes less than 48 hours.  She denies any rash, arthralgias, nausea vomiting diarrhea constipation or fevers.  She has a history of diabetes, hyperlipidemia and vitamin D deficiency she would like to get labs today.  She has not had her Ozempic in over a month due to it being out.   Objective   Vital Signs:   Vitals:    10/25/23 0843   BP: 126/80   Pulse: 84   Temp: 97.7 °F (36.5 °C)   SpO2: 99%   Weight: 116 kg (255 lb)   Height: 165.1 cm (65\")       Wt Readings from Last 3 Encounters:   10/25/23 116 kg (255 lb)   10/15/23 117 kg (257 lb 6.4 oz)   08/30/23 116 kg (255 lb 12.8 oz)     BP Readings from Last 3 Encounters:   10/25/23 126/80   10/15/23 123/60   08/30/23 104/62         Physical Exam   General: Obese -American female appears stated age AAO x3, no acute distress.  Eyes:  EOMI PERRLA  Ears/Nose/Mouth/Throat:  Moist mucous membranes  Respiratory:  CTA bilaterally, no wheezes rales or rhonchi  Cardiovascular:  RRR no murmur rubs or gallops  Gastrointestinal:  Abdomen soft nondistended nontender bowel sounds present x4 quadrants  Musculoskeletal:  Moves all 4 extremities spontaneously, no apparent weakness  Skin:  Warm, dry, no skin rashes or lesions  Neurologic:  AAO x3, cranial nerves 2-12 grossly intact, no focal neuro deficits  Psychiatric:  Normal mood and affect      Result Review :  The following data was reviewed by: " Chino Beck MD on 10/25/2023:      Procedures        Assessment and Plan   Diagnoses and all orders for this visit:    1. Type 2 diabetes mellitus without complication, without long-term current use of insulin (Primary)  -     CBC & Differential  -     Comprehensive Metabolic Panel  -     Hemoglobin A1c    2. Tick bite of abdomen, sequela  -     Lyme Disease Total Antibody With Reflex to Immunoassay; Future    3. Hyperlipidemia, unspecified hyperlipidemia type  -     Lipid Panel    4. Current severe episode of major depressive disorder without psychotic features, unspecified whether recurrent    5. Need for influenza vaccination  -     Fluzone >6 Months (4722-5260)    6. Vitamin D deficiency  -     Vitamin D,25-Hydroxy; Future    Other orders  -     Discontinue: Semaglutide, 2 MG/DOSE, (OZEMPIC) 8 MG/3ML solution pen-injector; Inject 2 mg under the skin into the appropriate area as directed 1 (One) Time Per Week for 4 doses.  Dispense: 24 mL; Refill: 12  -     Discontinue: escitalopram (Lexapro) 20 MG tablet; Take 1 tablet by mouth Daily for 90 days.  Dispense: 90 tablet; Refill: 2  -     Semaglutide, 2 MG/DOSE, (OZEMPIC) 8 MG/3ML solution pen-injector; Inject 2 mg under the skin into the appropriate area as directed 1 (One) Time Per Week for 4 doses.  Dispense: 24 mL; Refill: 12  -     escitalopram (Lexapro) 20 MG tablet; Take 1 tablet by mouth Daily for 90 days.  Dispense: 90 tablet; Refill: 2      Plan increase semaglutide to 2 mg, get labs today Lyme titers entered for the future in 2 more weeks discussed with patient it is best to get them in 4 to 6 weeks.     We will check vitamin D and see if she still needs it today      FOLLOW UP  Return in about 3 months (around 1/25/2024), or if symptoms worsen or fail to improve, for Annual physical.  Patient was given instructions and counseling regarding her condition or for health maintenance advice. Please see specific information pulled into the AVS if  appropriate.       Chino Beck MD  10/25/23  09:43 EDT    CURRENT & DISCONTINUED MEDICATIONS  Current Outpatient Medications   Medication Instructions    Accu-Chek Guide test strip 1 each, Other, Daily, use to check blood sugar every day    Accu-Chek Softclix Lancets lancets USE TO TEST BLOOD SUGAR EVERY DAY    albuterol sulfate  (90 Base) MCG/ACT inhaler 2 puffs, Inhalation, Every 4 Hours PRN    atorvastatin (LIPITOR) 40 mg, Oral, Nightly    Blood Glucose Monitoring Suppl (Accu-Chek Guide) w/Device kit USE AS DIRECTED TO CHECK BLOOD SUGAR EVERY DAY    doxycycline (ADOXA) 100 mg, Oral, 2 Times Daily    escitalopram (LEXAPRO) 20 mg, Oral, Daily    Semaglutide (2 MG/DOSE) (OZEMPIC) 2 mg, Subcutaneous, Weekly    vitamin D (ERGOCALCIFEROL) 50,000 Units, Oral, Weekly       Medications Discontinued During This Encounter   Medication Reason    Semaglutide, 1 MG/DOSE, (OZEMPIC) 4 MG/3ML solution pen-injector     escitalopram (Lexapro) 10 MG tablet     Semaglutide, 2 MG/DOSE, (OZEMPIC) 8 MG/3ML solution pen-injector Reorder    escitalopram (Lexapro) 20 MG tablet Reorder

## 2024-01-25 ENCOUNTER — OFFICE VISIT (OUTPATIENT)
Dept: FAMILY MEDICINE CLINIC | Facility: CLINIC | Age: 29
End: 2024-01-25
Payer: COMMERCIAL

## 2024-01-25 VITALS
SYSTOLIC BLOOD PRESSURE: 104 MMHG | WEIGHT: 264 LBS | DIASTOLIC BLOOD PRESSURE: 80 MMHG | OXYGEN SATURATION: 96 % | BODY MASS INDEX: 43.99 KG/M2 | TEMPERATURE: 95 F | HEART RATE: 95 BPM | HEIGHT: 65 IN

## 2024-01-25 DIAGNOSIS — E11.9 TYPE 2 DIABETES MELLITUS WITHOUT COMPLICATION, WITHOUT LONG-TERM CURRENT USE OF INSULIN: Primary | ICD-10-CM

## 2024-01-25 DIAGNOSIS — E66.01 CLASS 3 SEVERE OBESITY DUE TO EXCESS CALORIES WITHOUT SERIOUS COMORBIDITY WITH BODY MASS INDEX (BMI) OF 40.0 TO 44.9 IN ADULT: ICD-10-CM

## 2024-01-25 NOTE — PROGRESS NOTES
"Chief Complaint  Diabetes and Follow-up    Subjective      Diabetes      Analisa Mell Isaac is a 28 y.o. female who presents to [facility] with a past medical history of diabetes, hyperlipidemia and vitamin D deficiency.  Pt has been unable to get her Ozempic in 4 months now. I called the pharmacist while she was in the room with me we were able to get it ordered and he stated he may have it for her tmrw.  Pt is otherwise feeling well. 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          Past Medical History:   Diagnosis Date    Asthma 10/19/2020    Diabetes mellitus     Left knee pain 10/19/2020         Objective   Vital Signs:   Vitals:    01/25/24 0823   BP: 104/80   Pulse: 95   Temp: 95 °F (35 °C)   SpO2: 96%   Weight: 120 kg (264 lb)   Height: 165.1 cm (65\")     Body mass index is 43.93 kg/m².    Wt Readings from Last 3 Encounters:   01/25/24 120 kg (264 lb)   10/25/23 116 kg (255 lb)   10/15/23 117 kg (257 lb 6.4 oz)     BP Readings from Last 3 Encounters:   01/25/24 104/80   10/25/23 126/80   10/15/23 123/60       Health Maintenance   Topic Date Due    Hepatitis B (1 of 3 - 3-dose series) Never done    Pneumococcal Vaccine 0-64 (1 of 2 - PCV) Never done    ANNUAL PHYSICAL  Never done    DIABETIC FOOT EXAM  Never done    COVID-19 Vaccine (2 - 2023-24 season) 09/01/2023    URINE MICROALBUMIN  02/28/2024    HEMOGLOBIN A1C  04/25/2024    BMI FOLLOWUP  05/30/2024    DIABETIC EYE EXAM  10/20/2024    LIPID PANEL  10/25/2024    PAP SMEAR  05/16/2025    TDAP/TD VACCINES (2 - Td or Tdap) 11/29/2032    HEPATITIS C SCREENING  Completed    INFLUENZA VACCINE  Completed       Physical Exam  Constitutional:       Appearance: Normal appearance.   HENT:      Head: Normocephalic.      Nose: Nose normal.      Mouth/Throat:      Mouth: Mucous membranes are moist.   Eyes:      Pupils: Pupils are equal, round, and reactive to light.   Cardiovascular:      Rate and Rhythm: Normal " rate and regular rhythm.   Pulmonary:      Effort: Pulmonary effort is normal.   Abdominal:      General: Abdomen is flat.   Musculoskeletal:         General: Normal range of motion.      Cervical back: Normal range of motion.   Skin:     General: Skin is warm and dry.   Neurological:      General: No focal deficit present.      Mental Status: She is alert.   Psychiatric:         Mood and Affect: Mood normal.         Thought Content: Thought content normal.            Result Review :  The following data was reviewed by: Chino Beck MD on 01/25/2024:      Procedures        Assessment and Plan   Diagnoses and all orders for this visit:    1. Type 2 diabetes mellitus without complication, without long-term current use of insulin (Primary)    2. Class 3 severe obesity due to excess calories without serious comorbidity with body mass index (BMI) of 40.0 to 44.9 in adult      To see her back in 3 months after she continues using GLP-1 Ozempic she is currently not taking anything so do not plan on getting labs today.            FOLLOW UP  Return in about 3 months (around 4/25/2024).  Patient was given instructions and counseling regarding her condition or for health maintenance advice. Please see specific information pulled into the AVS if appropriate.       Chino Beck MD  01/25/24  09:00 EST    CURRENT & DISCONTINUED MEDICATIONS  Current Outpatient Medications   Medication Instructions    Accu-Chek Guide test strip 1 each, Other, Daily, use to check blood sugar every day    Accu-Chek Softclix Lancets lancets USE TO TEST BLOOD SUGAR EVERY DAY    albuterol sulfate  (90 Base) MCG/ACT inhaler 2 puffs, Inhalation, Every 4 Hours PRN    atorvastatin (LIPITOR) 40 mg, Oral, Nightly    Blood Glucose Monitoring Suppl (Accu-Chek Guide) w/Device kit USE AS DIRECTED TO CHECK BLOOD SUGAR EVERY DAY    doxycycline (ADOXA) 100 mg, Oral, 2 Times Daily    escitalopram (LEXAPRO) 20 mg, Oral, Daily    vitamin D (ERGOCALCIFEROL)  50,000 Units, Oral, Weekly       There are no discontinued medications.       Answers submitted by the patient for this visit:  Primary Reason for Visit (Submitted on 1/25/2024)  What is the primary reason for your visit?: Diabetes

## 2024-04-25 ENCOUNTER — OFFICE VISIT (OUTPATIENT)
Dept: FAMILY MEDICINE CLINIC | Facility: CLINIC | Age: 29
End: 2024-04-25
Payer: COMMERCIAL

## 2024-04-25 VITALS
WEIGHT: 246.5 LBS | OXYGEN SATURATION: 97 % | SYSTOLIC BLOOD PRESSURE: 110 MMHG | BODY MASS INDEX: 41.07 KG/M2 | DIASTOLIC BLOOD PRESSURE: 68 MMHG | TEMPERATURE: 98.1 F | HEIGHT: 65 IN | HEART RATE: 98 BPM

## 2024-04-25 DIAGNOSIS — S30.861S TICK BITE OF ABDOMEN, SEQUELA: ICD-10-CM

## 2024-04-25 DIAGNOSIS — E11.9 TYPE 2 DIABETES MELLITUS WITHOUT COMPLICATION, WITHOUT LONG-TERM CURRENT USE OF INSULIN: ICD-10-CM
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
## 2024-04-25 DIAGNOSIS — E78.2 MIXED HYPERLIPIDEMIA: ICD-10-CM

## 2024-04-25 DIAGNOSIS — E55.9 VITAMIN D DEFICIENCY: ICD-10-CM

## 2024-04-25 DIAGNOSIS — Z00.00 ANNUAL PHYSICAL EXAM: Primary | ICD-10-CM

## 2024-04-25 LAB
25(OH)D3 SERPL-MCNC: 8.2 NG/ML (ref 30–100)
ALBUMIN SERPL-MCNC: 4.3 G/DL (ref 3.5–5.2)
ALBUMIN UR-MCNC: 5.6 MG/DL
ALBUMIN/GLOB SERPL: 2.3 G/DL
ALP SERPL-CCNC: 50 U/L (ref 39–117)
ALT SERPL W P-5'-P-CCNC: 33 U/L (ref 1–33)
ANION GAP SERPL CALCULATED.3IONS-SCNC: 7 MMOL/L (ref 5–15)
AST SERPL-CCNC: 20 U/L (ref 1–32)
BASOPHILS # BLD AUTO: 0.03 10*3/MM3 (ref 0–0.2)
BASOPHILS NFR BLD AUTO: 0.5 % (ref 0–1.5)
BILIRUB SERPL-MCNC: 0.5 MG/DL (ref 0–1.2)
BUN SERPL-MCNC: 8 MG/DL (ref 6–20)
BUN/CREAT SERPL: 14.8 (ref 7–25)
CALCIUM SPEC-SCNC: 8.9 MG/DL (ref 8.6–10.5)
CHLORIDE SERPL-SCNC: 106 MMOL/L (ref 98–107)
CHOLEST SERPL-MCNC: 141 MG/DL (ref 0–200)
CO2 SERPL-SCNC: 27 MMOL/L (ref 22–29)
CREAT SERPL-MCNC: 0.54 MG/DL (ref 0.57–1)
DEPRECATED RDW RBC AUTO: 39 FL (ref 37–54)
EGFRCR SERPLBLD CKD-EPI 2021: 128 ML/MIN/1.73
EOSINOPHIL # BLD AUTO: 0.45 10*3/MM3 (ref 0–0.4)
EOSINOPHIL NFR BLD AUTO: 8 % (ref 0.3–6.2)
ERYTHROCYTE [DISTWIDTH] IN BLOOD BY AUTOMATED COUNT: 12.2 % (ref 12.3–15.4)
GLOBULIN UR ELPH-MCNC: 1.9 GM/DL
GLUCOSE SERPL-MCNC: 117 MG/DL (ref 65–99)
HBA1C MFR BLD: 6.5 % (ref 4.8–5.6)
HCT VFR BLD AUTO: 41.2 % (ref 34–46.6)
HDLC SERPL-MCNC: 35 MG/DL (ref 40–60)
HGB BLD-MCNC: 13.5 G/DL (ref 12–15.9)
IMM GRANULOCYTES # BLD AUTO: 0.02 10*3/MM3 (ref 0–0.05)
IMM GRANULOCYTES NFR BLD AUTO: 0.4 % (ref 0–0.5)
LDLC SERPL CALC-MCNC: 86 MG/DL (ref 0–100)
LDLC/HDLC SERPL: 2.42 {RATIO}
LYMPHOCYTES # BLD AUTO: 1.52 10*3/MM3 (ref 0.7–3.1)
LYMPHOCYTES NFR BLD AUTO: 27.1 % (ref 19.6–45.3)
MCH RBC QN AUTO: 28.7 PG (ref 26.6–33)
MCHC RBC AUTO-ENTMCNC: 32.8 G/DL (ref 31.5–35.7)
MCV RBC AUTO: 87.7 FL (ref 79–97)
MONOCYTES # BLD AUTO: 0.47 10*3/MM3 (ref 0.1–0.9)
MONOCYTES NFR BLD AUTO: 8.4 % (ref 5–12)
NEUTROPHILS NFR BLD AUTO: 3.11 10*3/MM3 (ref 1.7–7)
NEUTROPHILS NFR BLD AUTO: 55.6 % (ref 42.7–76)
NRBC BLD AUTO-RTO: 0 /100 WBC (ref 0–0.2)
PLATELET # BLD AUTO: 309 10*3/MM3 (ref 140–450)
PMV BLD AUTO: 10.7 FL (ref 6–12)
POTASSIUM SERPL-SCNC: 4.1 MMOL/L (ref 3.5–5.2)
PROT SERPL-MCNC: 6.2 G/DL (ref 6–8.5)
RBC # BLD AUTO: 4.7 10*6/MM3 (ref 3.77–5.28)
SODIUM SERPL-SCNC: 140 MMOL/L (ref 136–145)
TRIGL SERPL-MCNC: 107 MG/DL (ref 0–150)
TSH SERPL DL<=0.05 MIU/L-ACNC: 1.21 UIU/ML (ref 0.27–4.2)
VLDLC SERPL-MCNC: 20 MG/DL (ref 5–40)
WBC NRBC COR # BLD AUTO: 5.6 10*3/MM3 (ref 3.4–10.8)

## 2024-04-25 PROCEDURE — 86618 LYME DISEASE ANTIBODY: CPT | Performed by: STUDENT IN AN ORGANIZED HEALTH CARE EDUCATION/TRAINING PROGRAM

## 2024-04-25 PROCEDURE — 36415 COLL VENOUS BLD VENIPUNCTURE: CPT | Performed by: STUDENT IN AN ORGANIZED HEALTH CARE EDUCATION/TRAINING PROGRAM

## 2024-04-25 PROCEDURE — 80061 LIPID PANEL: CPT | Performed by: STUDENT IN AN ORGANIZED HEALTH CARE EDUCATION/TRAINING PROGRAM

## 2024-04-25 PROCEDURE — 84443 ASSAY THYROID STIM HORMONE: CPT | Performed by: STUDENT IN AN ORGANIZED HEALTH CARE EDUCATION/TRAINING PROGRAM

## 2024-04-25 PROCEDURE — 85025 COMPLETE CBC W/AUTO DIFF WBC: CPT | Performed by: STUDENT IN AN ORGANIZED HEALTH CARE EDUCATION/TRAINING PROGRAM

## 2024-04-25 PROCEDURE — 83036 HEMOGLOBIN GLYCOSYLATED A1C: CPT | Performed by: STUDENT IN AN ORGANIZED HEALTH CARE EDUCATION/TRAINING PROGRAM

## 2024-04-25 PROCEDURE — 80053 COMPREHEN METABOLIC PANEL: CPT | Performed by: STUDENT IN AN ORGANIZED HEALTH CARE EDUCATION/TRAINING PROGRAM

## 2024-04-25 PROCEDURE — 99385 PREV VISIT NEW AGE 18-39: CPT | Performed by: STUDENT IN AN ORGANIZED HEALTH CARE EDUCATION/TRAINING PROGRAM

## 2024-04-25 PROCEDURE — 82043 UR ALBUMIN QUANTITATIVE: CPT | Performed by: STUDENT IN AN ORGANIZED HEALTH CARE EDUCATION/TRAINING PROGRAM

## 2024-04-25 PROCEDURE — 82306 VITAMIN D 25 HYDROXY: CPT | Performed by: STUDENT IN AN ORGANIZED HEALTH CARE EDUCATION/TRAINING PROGRAM

## 2024-04-25 RX ORDER — SEMAGLUTIDE 2.68 MG/ML
INJECTION, SOLUTION SUBCUTANEOUS
COMMUNITY
Start: 2024-04-19

## 2024-04-25 NOTE — PROGRESS NOTES
"Chief Complaint  Diabetes (3 month follow-uo )    Subjective      History of Present Illness    Analisa Isaac is a 28 y.o. female who presents to [facility]   She has a history of diabetes, hyperlipidemia and vitamin D deficiency presents for annual physical today. She is currently on 2 mg weekly of ozempic.  She has lost 20lbs since our last visit.  She is doing well.        Objective   Vital Signs:   Vitals:    04/25/24 0934   BP: 110/68   Pulse: 98   Temp: 98.1 °F (36.7 °C)   SpO2: 97%   Weight: 112 kg (246 lb 8 oz)   Height: 165.1 cm (65\")   PainSc: 0-No pain     Body mass index is 41.02 kg/m².    Wt Readings from Last 3 Encounters:   04/25/24 112 kg (246 lb 8 oz)   01/25/24 120 kg (264 lb)   10/25/23 116 kg (255 lb)     BP Readings from Last 3 Encounters:   04/25/24 110/68   01/25/24 104/80   10/25/23 126/80       Health Maintenance   Topic Date Due    Pneumococcal Vaccine 0-64 (1 of 2 - PCV) Never done    Hepatitis B (1 of 3 - 19+ 3-dose series) Never done    ANNUAL PHYSICAL  Never done    DIABETIC FOOT EXAM  Never done    URINE MICROALBUMIN  02/28/2024    HEMOGLOBIN A1C  04/25/2024    COVID-19 Vaccine (2 - 2023-24 season) 07/15/2024 (Originally 9/1/2023)    INFLUENZA VACCINE  08/01/2024    DIABETIC EYE EXAM  10/20/2024    LIPID PANEL  10/25/2024    BMI FOLLOWUP  04/19/2025    PAP SMEAR  05/16/2025    TDAP/TD VACCINES (2 - Td or Tdap) 11/29/2032    HEPATITIS C SCREENING  Completed       Physical Exam  Constitutional:       Appearance: Normal appearance. She is obese.   HENT:      Head: Normocephalic.      Nose: Nose normal.      Mouth/Throat:      Mouth: Mucous membranes are moist.   Eyes:      Pupils: Pupils are equal, round, and reactive to light.   Cardiovascular:      Rate and Rhythm: Normal rate and regular rhythm.   Pulmonary:      Effort: Pulmonary effort is normal.   Abdominal:      General: Abdomen is flat.   Musculoskeletal:         General: Normal range of " motion.      Cervical back: Normal range of motion.   Skin:     General: Skin is warm and dry.   Neurological:      General: No focal deficit present.      Mental Status: She is alert.   Psychiatric:         Mood and Affect: Mood normal.         Thought Content: Thought content normal.          Result Review :     The following data was reviewed by: Chino Beck MD on 04/25/2024:      Procedures          Diagnoses and all orders for this visit:    1. Annual physical exam (Primary)  -     Comprehensive Metabolic Panel  -     CBC & Differential  -     TSH  -     Lipid Panel  -     Lyme Disease Total Antibody With Reflex to Immunoassay    2. Type 2 diabetes mellitus without complication, without long-term current use of insulin  -     MicroAlbumin, Urine, Random - Urine, Clean Catch  -     Hemoglobin A1c; Future  -     Lyme Disease Total Antibody With Reflex to Immunoassay  -     Hemoglobin A1c    3. Mixed hyperlipidemia  -     Lyme Disease Total Antibody With Reflex to Immunoassay    4. Tick bite of abdomen, sequela  -     Lyme Disease Total Antibody With Reflex to Immunoassay    5. Vitamin D deficiency  -     Lyme Disease Total Antibody With Reflex to Immunoassay  -     Vitamin D,25-Hydroxy         Obtain labs today we will see her back in 3 months for follow-up         FOLLOW UP  Return for Annual physical.  Patient was given instructions and counseling regarding her condition or for health maintenance advice. Please see specific information pulled into the AVS if appropriate.       Chino Beck MD  04/25/24  10:08 EDT    CURRENT & DISCONTINUED MEDICATIONS  Current Outpatient Medications   Medication Instructions    Accu-Chek Guide test strip 1 each, Other, Daily, use to check blood sugar every day    Accu-Chek Softclix Lancets lancets USE TO TEST BLOOD SUGAR EVERY DAY    albuterol sulfate  (90 Base) MCG/ACT inhaler 2 puffs, Inhalation, Every 4 Hours PRN    atorvastatin (LIPITOR) 40 mg, Oral, Nightly     Blood Glucose Monitoring Suppl (Accu-Chek Guide) w/Device kit USE AS DIRECTED TO CHECK BLOOD SUGAR EVERY DAY    doxycycline (ADOXA) 100 mg, Oral, 2 Times Daily    escitalopram (LEXAPRO) 20 mg, Oral, Daily    Ozempic, 2 MG/DOSE, 8 MG/3ML solution pen-injector     vitamin D (ERGOCALCIFEROL) 50,000 Units, Oral, Weekly       There are no discontinued medications.   Answers submitted by the patient for this visit:  Primary Reason for Visit (Submitted on 4/25/2024)  What is the primary reason for your visit?: Diabetes  Diabetes Questionnaire (Submitted on 4/25/2024)  Chief Complaint: Diabetes problem  Below 70: never

## 2024-04-26 LAB — B BURGDOR IGG+IGM SER QL IA: NEGATIVE

## 2024-04-29 DIAGNOSIS — E55.9 VITAMIN D DEFICIENCY: Primary | ICD-10-CM

## 2024-04-29 RX ORDER — ERGOCALCIFEROL 1.25 MG/1
50000 CAPSULE ORAL WEEKLY
Qty: 12 CAPSULE | Refills: 3 | Status: SHIPPED | OUTPATIENT
Start: 2024-04-29 | End: 2024-07-28

## 2024-07-21 PROCEDURE — 87205 SMEAR GRAM STAIN: CPT | Performed by: FAMILY MEDICINE

## 2024-07-21 PROCEDURE — 87186 SC STD MICRODIL/AGAR DIL: CPT | Performed by: FAMILY MEDICINE

## 2024-07-21 PROCEDURE — 87070 CULTURE OTHR SPECIMN AEROBIC: CPT | Performed by: FAMILY MEDICINE

## 2024-07-21 PROCEDURE — 87147 CULTURE TYPE IMMUNOLOGIC: CPT | Performed by: FAMILY MEDICINE

## 2024-08-28 ENCOUNTER — OFFICE VISIT (OUTPATIENT)
Dept: FAMILY MEDICINE CLINIC | Facility: CLINIC | Age: 29
End: 2024-08-28
Payer: COMMERCIAL

## 2024-08-28 VITALS
WEIGHT: 257 LBS | OXYGEN SATURATION: 97 % | HEART RATE: 74 BPM | TEMPERATURE: 97 F | BODY MASS INDEX: 42.82 KG/M2 | HEIGHT: 65 IN

## 2024-08-28 DIAGNOSIS — E78.2 MIXED HYPERLIPIDEMIA: Primary | ICD-10-CM

## 2024-08-28 DIAGNOSIS — E11.9 TYPE 2 DIABETES MELLITUS WITHOUT COMPLICATION, WITHOUT LONG-TERM CURRENT USE OF INSULIN: ICD-10-CM

## 2024-08-28 DIAGNOSIS — E66.01 CLASS 3 SEVERE OBESITY DUE TO EXCESS CALORIES WITHOUT SERIOUS COMORBIDITY WITH BODY MASS INDEX (BMI) OF 40.0 TO 44.9 IN ADULT: ICD-10-CM

## 2024-08-28 PROCEDURE — 99214 OFFICE O/P EST MOD 30 MIN: CPT | Performed by: STUDENT IN AN ORGANIZED HEALTH CARE EDUCATION/TRAINING PROGRAM

## 2024-08-28 NOTE — PROGRESS NOTES
"Chief Complaint  Anxiety and Diabetes    Subjective      History of Present Illness    Analisa Isaac is a 28 y.o. female who presents to [facility]   History of obesity type 2 diabetes, hyperlipidemia KAILA presents for follow-up today.  She states she has been on Ozempic, she states she is not losing weight and she feels as if her nausea is increasing and she stopped taking it last week.  Patient is also having some increased anxiety we discussed continuing Lexapro for now.  And taking it daily.  Denies any HI or SI at this time.  Objective   Vital Signs:   Vitals:    08/28/24 1516   Pulse: 74   Temp: 97 °F (36.1 °C)   SpO2: 97%   Weight: 117 kg (257 lb)   Height: 165.1 cm (65\")     Body mass index is 42.77 kg/m².    Wt Readings from Last 3 Encounters:   08/28/24 117 kg (257 lb)   08/13/24 118 kg (259 lb 1.6 oz)   07/21/24 113 kg (250 lb)     BP Readings from Last 3 Encounters:   08/13/24 139/89   07/21/24 137/63   04/25/24 110/68       Health Maintenance   Topic Date Due    DIABETIC FOOT EXAM  Never done    BMI FOLLOWUP  05/30/2024    INFLUENZA VACCINE  08/01/2024    DIABETIC EYE EXAM  10/20/2024    HEMOGLOBIN A1C  10/25/2024    COVID-19 Vaccine (2 - 2023-24 season) 11/17/2024 (Originally 9/1/2023)    Hepatitis B (1 of 3 - 19+ 3-dose series) 08/28/2025 (Originally 10/21/2013)    Pneumococcal Vaccine 0-64 (1 of 2 - PCV) 08/28/2025 (Originally 10/21/2000)    ANNUAL PHYSICAL  04/25/2025    LIPID PANEL  04/25/2025    URINE MICROALBUMIN  04/25/2025    PAP SMEAR  05/16/2025    TDAP/TD VACCINES (2 - Td or Tdap) 11/29/2032    HEPATITIS C SCREENING  Completed       Physical Exam   General: Obese -American female AAO x3, no acute distress.  Eyes:  EOMI, PERRLA  Ears/Nose/Mouth/Throat:  Moist mucous membranes  Respiratory:  CTA bilaterally, no wheezes rales or rhonchi  Cardiovascular:  RRR no murmur rubs or gallops  Musculoskeletal:  Moves all 4 extremities spontaneously, no " apparent weakness  Skin:  Warm, dry, no skin rashes or lesions  Neurologic:  AAO x3, cranial nerves 2-12 grossly intact, no focal neuro deficits  Psychiatric:  Normal mood and affect    Result Review :     The following data was reviewed by: Chino Beck MD on 08/28/2024:      Procedures          Diagnoses and all orders for this visit:    1. Mixed hyperlipidemia (Primary)    2. Type 2 diabetes mellitus without complication, without long-term current use of insulin  -     Tirzepatide (MOUNJARO) 2.5 MG/0.5ML solution pen-injector pen; Inject 0.5 mL under the skin into the appropriate area as directed 1 (One) Time Per Week.  Dispense: 2 mL; Refill: 0    3. Class 3 severe obesity due to excess calories without serious comorbidity with body mass index (BMI) of 40.0 to 44.9 in adult       Continue current medication regimen switched to Ozempic to Mounjaro today  Start Mounjaro today with plan to increase to 5 mg next month            FOLLOW UP  Return in about 8 weeks (around 10/23/2024).  Patient was given instructions and counseling regarding her condition or for health maintenance advice. Please see specific information pulled into the AVS if appropriate.       Chino Beck MD  08/29/24  08:04 EDT    CURRENT & DISCONTINUED MEDICATIONS  Current Outpatient Medications   Medication Instructions    Accu-Chek Guide test strip 1 each, Other, Daily, use to check blood sugar every day    Accu-Chek Softclix Lancets lancets USE TO TEST BLOOD SUGAR EVERY DAY    albuterol sulfate  (90 Base) MCG/ACT inhaler 2 puffs, Inhalation, Every 4 Hours PRN    Blood Glucose Monitoring Suppl (Accu-Chek Guide) w/Device kit USE AS DIRECTED TO CHECK BLOOD SUGAR EVERY DAY    escitalopram (LEXAPRO) 20 mg, Oral, Daily    Tirzepatide (MOUNJARO) 2.5 mg, Subcutaneous, Weekly       Medications Discontinued During This Encounter   Medication Reason    Ozempic, 2 MG/DOSE, 8 MG/3ML solution pen-injector       Answers submitted by the patient for  this visit:  Other (Submitted on 8/28/2024)  Please describe your symptoms.: Yearly check up  Have you had these symptoms before?: No  How long have you been having these symptoms?: Greater than 2 weeks  Primary Reason for Visit (Submitted on 8/28/2024)  What is the primary reason for your visit?: Other

## 2024-09-10 ENCOUNTER — TELEMEDICINE (OUTPATIENT)
Dept: FAMILY MEDICINE CLINIC | Facility: TELEHEALTH | Age: 29
End: 2024-09-10
Payer: COMMERCIAL

## 2024-09-10 DIAGNOSIS — J06.9 ACUTE URI: Primary | ICD-10-CM

## 2024-09-10 DIAGNOSIS — J02.9 ACUTE PHARYNGITIS, UNSPECIFIED ETIOLOGY: ICD-10-CM

## 2024-09-10 PROCEDURE — 99213 OFFICE O/P EST LOW 20 MIN: CPT | Performed by: NURSE PRACTITIONER

## 2024-09-10 NOTE — PROGRESS NOTES
[facility]  Analisa Mell Isaac is a 28 y.o. female  presents with complaint of symptoms starting about 2 hours ago including dry throat, chills/sweats. Last week she had a sore throat (for a week) and has had migraines intermittently. Took excedrin prior to visit which has improved her headache today. Temp was 99 earlier. Works at  and there has been covid around.     Review of Systems    Past Medical History:   Diagnosis Date    Asthma 10/19/2020    Diabetes mellitus     Left knee pain 10/19/2020       Family History   Problem Relation Age of Onset    Diabetes Father     Breast cancer Maternal Aunt 40    Diabetes Paternal Grandfather     Diabetes Other     ADD / ADHD Neg Hx     Alcohol abuse Neg Hx     Anxiety disorder Neg Hx     Bipolar disorder Neg Hx     Dementia Neg Hx     Depression Neg Hx     Drug abuse Neg Hx     OCD Neg Hx     Paranoid behavior Neg Hx     Schizophrenia Neg Hx     Seizures Neg Hx     Self-Injurious Behavior  Neg Hx     Suicide Attempts Neg Hx        Social History     Socioeconomic History    Marital status: Single   Tobacco Use    Smoking status: Never    Smokeless tobacco: Never   Vaping Use    Vaping status: Never Used   Substance and Sexual Activity    Alcohol use: Yes     Comment: Occasionally    Drug use: Never    Sexual activity: Not Currently     Partners: Male     Birth control/protection: Condom, None         LMP 08/03/2024 (Exact Date)     PHYSICAL EXAM  Physical Exam   Constitutional: She appears well-developed and well-nourished.   HENT:   Head: Normocephalic.   Nose: Nose normal.   Neck: Neck normal appearance.  Pulmonary/Chest: Effort normal.   Neurological: She is alert.   Psychiatric: She has a normal mood and affect. Her speech is normal.       Diagnoses and all orders for this visit:    1. Acute URI (Primary)  -     JACK FLU + SARS PCR; Future  -     POC Strep A, PCR (Roche Jack); Future    2. Acute pharyngitis, unspecified etiology  -     JACK FLU + SARS PCR;  Future  -     POC Strep A, PCR (Roche Delma); Future      Negative for flu, strep and covid today.    FOLLOW-UP  As discussed during visit with [facility], if symptoms worsen or fail to improve, follow-up with PCP/Urgent Care/Emergency Department.    Patient verbalizes understanding of medications, instructions for treatment and follow-up.    Gwen Maher Abiodun, APRN  09/10/2024  16:34 EDT    The use of a video visit has been reviewed with the patient and verbal informed consent has been obtained. Myself and Analisa Isaac participated in this visit. The patient is located in [place], [place], and I am located in [place], [place]. [employer] and [employer] Video Client were utilized.

## 2024-09-10 NOTE — LETTER
September 10, 2024     Patient: Analisa Isaac   YOB: 1995   Date of Visit: 9/10/2024       To Whom It May Concern:    It is my medical opinion that Analisa Isaac  should be excused from work today and tomorrow .           Sincerely,        PERLA Guillaume    CC:   No Recipients

## 2024-09-10 NOTE — LETTER
September 10, 2024     Patient: Analisa Isaac   YOB: 1995   Date of Visit: 9/10/2024       To Whom It May Concern:    It is my medical opinion that Analisa Isaac  should be excused from work today .           Sincerely,        PERLA Guillaume    CC:   No Recipients

## 2024-10-31 ENCOUNTER — OFFICE VISIT (OUTPATIENT)
Dept: FAMILY MEDICINE CLINIC | Facility: CLINIC | Age: 29
End: 2024-10-31
Payer: COMMERCIAL

## 2024-10-31 VITALS
OXYGEN SATURATION: 98 % | BODY MASS INDEX: 41.65 KG/M2 | TEMPERATURE: 97.8 F | HEIGHT: 65 IN | WEIGHT: 250 LBS | HEART RATE: 90 BPM

## 2024-10-31 DIAGNOSIS — E66.813 CLASS 3 SEVERE OBESITY DUE TO EXCESS CALORIES WITHOUT SERIOUS COMORBIDITY WITH BODY MASS INDEX (BMI) OF 40.0 TO 44.9 IN ADULT: ICD-10-CM

## 2024-10-31 DIAGNOSIS — Z23 NEED FOR INFLUENZA VACCINATION: ICD-10-CM

## 2024-10-31 DIAGNOSIS — E11.9 TYPE 2 DIABETES MELLITUS WITHOUT COMPLICATION, WITHOUT LONG-TERM CURRENT USE OF INSULIN: Primary | ICD-10-CM

## 2024-10-31 DIAGNOSIS — F41.1 GAD (GENERALIZED ANXIETY DISORDER): ICD-10-CM

## 2024-10-31 DIAGNOSIS — E66.01 CLASS 3 SEVERE OBESITY DUE TO EXCESS CALORIES WITHOUT SERIOUS COMORBIDITY WITH BODY MASS INDEX (BMI) OF 40.0 TO 44.9 IN ADULT: ICD-10-CM

## 2024-10-31 DIAGNOSIS — E78.2 MIXED HYPERLIPIDEMIA: ICD-10-CM

## 2024-10-31 PROCEDURE — 99214 OFFICE O/P EST MOD 30 MIN: CPT | Performed by: STUDENT IN AN ORGANIZED HEALTH CARE EDUCATION/TRAINING PROGRAM

## 2024-10-31 PROCEDURE — 90656 IIV3 VACC NO PRSV 0.5 ML IM: CPT | Performed by: STUDENT IN AN ORGANIZED HEALTH CARE EDUCATION/TRAINING PROGRAM

## 2024-10-31 PROCEDURE — 90471 IMMUNIZATION ADMIN: CPT | Performed by: STUDENT IN AN ORGANIZED HEALTH CARE EDUCATION/TRAINING PROGRAM

## 2024-10-31 RX ORDER — BUSPIRONE HYDROCHLORIDE 5 MG/1
5 TABLET ORAL 2 TIMES DAILY PRN
Qty: 180 TABLET | Refills: 1 | Status: SHIPPED | OUTPATIENT
Start: 2024-10-31

## 2024-10-31 NOTE — PROGRESS NOTES
"Chief Complaint  Diabetes and Anxiety (Would like to get upped on her lexapro if possible.)    Subjective      History of Present Illness    Analisa Isaac is a 29 y.o. female who presents to [facility]     She has a history of DM-2, hyperlipidemia and vitamin D deficiency presents for follow-up today. She is currently on 2 mg weekly of ozempic but would like to be switched over to a different medication.  She has lost 20lbs since our last visit.  She is doing well.         Objective   Vital Signs:   Vitals:    10/31/24 1449   Pulse: 90   Temp: 97.8 °F (36.6 °C)   SpO2: 98%   Weight: 113 kg (250 lb)   Height: 165.1 cm (65\")     Body mass index is 41.6 kg/m².    Wt Readings from Last 3 Encounters:   10/31/24 113 kg (250 lb)   08/28/24 117 kg (257 lb)   08/13/24 118 kg (259 lb 1.6 oz)     BP Readings from Last 3 Encounters:   08/13/24 139/89   07/21/24 137/63   04/25/24 110/68       Health Maintenance   Topic Date Due    DIABETIC FOOT EXAM  Never done    DIABETIC EYE EXAM  10/20/2024    COVID-19 Vaccine (2 - 2024-25 season) 01/20/2025 (Originally 9/1/2024)    Hepatitis B (1 of 3 - 19+ 3-dose series) 08/28/2025 (Originally 10/21/2013)    Pneumococcal Vaccine 0-64 (1 of 2 - PCV) 08/28/2025 (Originally 10/21/2000)    ANNUAL PHYSICAL  04/25/2025    URINE MICROALBUMIN  04/25/2025    HEMOGLOBIN A1C  05/01/2025    PAP SMEAR  05/16/2025    BMI FOLLOWUP  10/31/2025    LIPID PANEL  11/01/2025    TDAP/TD VACCINES (2 - Td or Tdap) 11/29/2032    HEPATITIS C SCREENING  Completed    INFLUENZA VACCINE  Completed     Lab Results   Component Value Date    WBC 6.75 11/01/2024    RBC 4.78 11/01/2024    HGB 13.4 11/01/2024    HCT 41.0 11/01/2024    MCV 85.8 11/01/2024    MCH 28.0 11/01/2024    MCHC 32.7 11/01/2024    RDW 11.9 (L) 11/01/2024    RDWSD 36.9 (L) 11/01/2024    MPV 10.9 11/01/2024     11/01/2024    NEUTRORELPCT 55.6 11/01/2024    LYMPHORELPCT 32.0 11/01/2024    MONORELPCT 7.7 " 11/01/2024    EOSRELPCT 3.9 11/01/2024    BASORELPCT 0.4 11/01/2024    AUTOIGPER 0.4 11/01/2024    NEUTROABS 3.75 11/01/2024    LYMPHSABS 2.16 11/01/2024    MONOSABS 0.52 11/01/2024    EOSABS 0.26 11/01/2024    BASOSABS 0.03 11/01/2024    AUTOIGNUM 0.03 11/01/2024    NRBC 0.0 11/01/2024     Lab Results   Component Value Date    GLUCOSE 120 (H) 11/01/2024    BUN 10 11/01/2024    CREATININE 0.61 11/01/2024     11/01/2024    K 4.1 11/01/2024     11/01/2024    CO2 25.2 11/01/2024    CALCIUM 9.3 11/01/2024    PROTEINTOT 7.3 11/01/2024    ALBUMIN 4.3 11/01/2024    ALT 18 11/01/2024    AST 16 11/01/2024    ALKPHOS 64 11/01/2024    BILITOT 0.5 11/01/2024    BCR 16.4 11/01/2024    ANIONGAP 12.8 11/01/2024     Lab Results   Component Value Date    TSH 1.210 04/25/2024     Lab Results   Component Value Date    HGBA1C 6.80 (H) 11/01/2024     Lab Results   Component Value Date    CHOL 162 11/01/2024    TRIG 72 11/01/2024    HDL 35 (L) 11/01/2024     (H) 11/01/2024       Physical Exam   General:  AAO x3, no acute distress.  Eyes:  EOMI, PERRLA  Ears/Nose/Mouth/Throat:  Moist mucous membranes  Respiratory:  CTA bilaterally, no wheezes rales or rhonchi  Cardiovascular:  RRR no murmur rubs or gallops  Gastrointestinal:  Abdomen soft nondistended nontender bowel sounds present x4 quadrants  Musculoskeletal:  Moves all 4 extremities spontaneously, no apparent weakness  Skin:  Warm, dry, no skin rashes or lesions  Neurologic:  AAO x3, cranial nerves 2-12 grossly intact, no focal neuro deficits  Psychiatric:  Normal mood and affect    Result Review :     The following data was reviewed by: Chino Beck MD on 10/31/2024:      Procedures          Diagnoses and all orders for this visit:    1. Type 2 diabetes mellitus without complication, without long-term current use of insulin (Primary)  -     CBC & Differential  -     Comprehensive Metabolic Panel  -     Hemoglobin A1c  -     Ambulatory Referral for Diabetic Eye  Exam-Ophthalmology    2. Class 3 severe obesity due to excess calories without serious comorbidity with body mass index (BMI) of 40.0 to 44.9 in adult    3. Mixed hyperlipidemia  -     Lipid Panel    4. Need for influenza vaccination  -     Fluzone >6mos ([id number])    5. KAILA (generalized anxiety disorder)  -     busPIRone (BUSPAR) 5 MG tablet; Take 1 tablet by mouth 2 (Two) Times a Day As Needed (anxiety).  Dispense: 180 tablet; Refill: 1    Other orders  -     Tirzepatide 2.5 MG/0.5ML solution auto-injector; Inject 2.5 mg under the skin into the appropriate area as directed 1 (One) Time Per Week.  Dispense: 0.5 mL; Refill: 0         Class 3 Severe Obesity (BMI >=40). Obesity-related health conditions include the following: none. Obesity is newly identified. BMI is is above average; BMI management plan is completed. We discussed low calorie, low carb based diet program, portion control, increasing exercise, and joining a fitness center or start home based exercise program.         FOLLOW UP  Return in about 3 months (around 1/31/2025).  Patient was given instructions and counseling regarding her condition or for health maintenance advice. Please see specific information pulled into the AVS if appropriate.       Chino Beck MD  11/04/24  14:36 EST    CURRENT & DISCONTINUED MEDICATIONS  Current Outpatient Medications   Medication Instructions    Accu-Chek Guide test strip 1 each, Other, Daily, use to check blood sugar every day    Accu-Chek Softclix Lancets lancets USE TO TEST BLOOD SUGAR EVERY DAY    albuterol sulfate  (90 Base) MCG/ACT inhaler 2 puffs, Inhalation, Every 4 Hours PRN    Blood Glucose Monitoring Suppl (Accu-Chek Guide) w/Device kit USE AS DIRECTED TO CHECK BLOOD SUGAR EVERY DAY    busPIRone (BUSPAR) 5 mg, Oral, 2 Times Daily PRN    escitalopram (LEXAPRO) 20 mg, Oral, Daily    Tirzepatide 2.5 mg, Subcutaneous, Weekly       Medications Discontinued During This Encounter   Medication Reason     Tirzepatide (MOUNJARO) 2.5 MG/0.5ML solution pen-injector pen       Answers submitted by the patient for this visit:  Other (Submitted on 10/31/2024)  Please describe your symptoms.: Check up  Have you had these symptoms before?: No  How long have you been having these symptoms?: Greater than 2 weeks  Please list any medications you are currently taking for this condition.: Check up  Please describe any probable cause for these symptoms. : Check up  Primary Reason for Visit (Submitted on 10/31/2024)  What is the primary reason for your visit?: Problem Not Listed

## 2024-11-01 ENCOUNTER — LAB (OUTPATIENT)
Dept: LAB | Facility: HOSPITAL | Age: 29
End: 2024-11-01
Payer: COMMERCIAL

## 2024-11-01 LAB
ALBUMIN SERPL-MCNC: 4.3 G/DL (ref 3.5–5.2)
ALBUMIN/GLOB SERPL: 1.4 G/DL
ALP SERPL-CCNC: 64 U/L (ref 39–117)
ALT SERPL W P-5'-P-CCNC: 18 U/L (ref 1–33)
ANION GAP SERPL CALCULATED.3IONS-SCNC: 12.8 MMOL/L (ref 5–15)
AST SERPL-CCNC: 16 U/L (ref 1–32)
BASOPHILS # BLD AUTO: 0.03 10*3/MM3 (ref 0–0.2)
BASOPHILS NFR BLD AUTO: 0.4 % (ref 0–1.5)
BILIRUB SERPL-MCNC: 0.5 MG/DL (ref 0–1.2)
BUN SERPL-MCNC: 10 MG/DL (ref 6–20)
BUN/CREAT SERPL: 16.4 (ref 7–25)
CALCIUM SPEC-SCNC: 9.3 MG/DL (ref 8.6–10.5)
CHLORIDE SERPL-SCNC: 105 MMOL/L (ref 98–107)
CHOLEST SERPL-MCNC: 162 MG/DL (ref 0–200)
CO2 SERPL-SCNC: 25.2 MMOL/L (ref 22–29)
CREAT SERPL-MCNC: 0.61 MG/DL (ref 0.57–1)
DEPRECATED RDW RBC AUTO: 36.9 FL (ref 37–54)
EGFRCR SERPLBLD CKD-EPI 2021: 123.5 ML/MIN/1.73
EOSINOPHIL # BLD AUTO: 0.26 10*3/MM3 (ref 0–0.4)
EOSINOPHIL NFR BLD AUTO: 3.9 % (ref 0.3–6.2)
ERYTHROCYTE [DISTWIDTH] IN BLOOD BY AUTOMATED COUNT: 11.9 % (ref 12.3–15.4)
GLOBULIN UR ELPH-MCNC: 3 GM/DL
GLUCOSE SERPL-MCNC: 120 MG/DL (ref 65–99)
HBA1C MFR BLD: 6.8 % (ref 4.8–5.6)
HCT VFR BLD AUTO: 41 % (ref 34–46.6)
HDLC SERPL-MCNC: 35 MG/DL (ref 40–60)
HGB BLD-MCNC: 13.4 G/DL (ref 12–15.9)
IMM GRANULOCYTES # BLD AUTO: 0.03 10*3/MM3 (ref 0–0.05)
IMM GRANULOCYTES NFR BLD AUTO: 0.4 % (ref 0–0.5)
LDLC SERPL CALC-MCNC: 113 MG/DL (ref 0–100)
LDLC/HDLC SERPL: 3.22 {RATIO}
LYMPHOCYTES # BLD AUTO: 2.16 10*3/MM3 (ref 0.7–3.1)
LYMPHOCYTES NFR BLD AUTO: 32 % (ref 19.6–45.3)
MCH RBC QN AUTO: 28 PG (ref 26.6–33)
MCHC RBC AUTO-ENTMCNC: 32.7 G/DL (ref 31.5–35.7)
MCV RBC AUTO: 85.8 FL (ref 79–97)
MONOCYTES # BLD AUTO: 0.52 10*3/MM3 (ref 0.1–0.9)
MONOCYTES NFR BLD AUTO: 7.7 % (ref 5–12)
NEUTROPHILS NFR BLD AUTO: 3.75 10*3/MM3 (ref 1.7–7)
NEUTROPHILS NFR BLD AUTO: 55.6 % (ref 42.7–76)
NRBC BLD AUTO-RTO: 0 /100 WBC (ref 0–0.2)
PLATELET # BLD AUTO: 382 10*3/MM3 (ref 140–450)
PMV BLD AUTO: 10.9 FL (ref 6–12)
POTASSIUM SERPL-SCNC: 4.1 MMOL/L (ref 3.5–5.2)
PROT SERPL-MCNC: 7.3 G/DL (ref 6–8.5)
RBC # BLD AUTO: 4.78 10*6/MM3 (ref 3.77–5.28)
SODIUM SERPL-SCNC: 143 MMOL/L (ref 136–145)
TRIGL SERPL-MCNC: 72 MG/DL (ref 0–150)
VLDLC SERPL-MCNC: 14 MG/DL (ref 5–40)
WBC NRBC COR # BLD AUTO: 6.75 10*3/MM3 (ref 3.4–10.8)

## 2024-11-01 PROCEDURE — 85025 COMPLETE CBC W/AUTO DIFF WBC: CPT | Performed by: STUDENT IN AN ORGANIZED HEALTH CARE EDUCATION/TRAINING PROGRAM

## 2024-11-01 PROCEDURE — 80053 COMPREHEN METABOLIC PANEL: CPT | Performed by: STUDENT IN AN ORGANIZED HEALTH CARE EDUCATION/TRAINING PROGRAM

## 2024-11-01 PROCEDURE — 80061 LIPID PANEL: CPT | Performed by: STUDENT IN AN ORGANIZED HEALTH CARE EDUCATION/TRAINING PROGRAM

## 2024-11-01 PROCEDURE — 83036 HEMOGLOBIN GLYCOSYLATED A1C: CPT | Performed by: STUDENT IN AN ORGANIZED HEALTH CARE EDUCATION/TRAINING PROGRAM

## 2024-11-01 PROCEDURE — 36415 COLL VENOUS BLD VENIPUNCTURE: CPT | Performed by: STUDENT IN AN ORGANIZED HEALTH CARE EDUCATION/TRAINING PROGRAM

## 2024-12-13 ENCOUNTER — TELEPHONE (OUTPATIENT)
Dept: FAMILY MEDICINE CLINIC | Facility: CLINIC | Age: 29
End: 2024-12-13
Payer: COMMERCIAL

## 2024-12-13 DIAGNOSIS — E66.813 CLASS 3 SEVERE OBESITY DUE TO EXCESS CALORIES WITHOUT SERIOUS COMORBIDITY WITH BODY MASS INDEX (BMI) OF 40.0 TO 44.9 IN ADULT: Primary | ICD-10-CM

## 2024-12-13 DIAGNOSIS — E66.01 CLASS 3 SEVERE OBESITY DUE TO EXCESS CALORIES WITHOUT SERIOUS COMORBIDITY WITH BODY MASS INDEX (BMI) OF 40.0 TO 44.9 IN ADULT: Primary | ICD-10-CM

## 2025-01-08 DIAGNOSIS — E66.01 CLASS 3 SEVERE OBESITY DUE TO EXCESS CALORIES WITHOUT SERIOUS COMORBIDITY WITH BODY MASS INDEX (BMI) OF 40.0 TO 44.9 IN ADULT: ICD-10-CM

## 2025-01-08 DIAGNOSIS — E66.813 CLASS 3 SEVERE OBESITY DUE TO EXCESS CALORIES WITHOUT SERIOUS COMORBIDITY WITH BODY MASS INDEX (BMI) OF 40.0 TO 44.9 IN ADULT: ICD-10-CM

## 2025-01-08 NOTE — TELEPHONE ENCOUNTER
Caller: Analisa Isaac    Relationship: Self    Best call back number: [phone]      Requested Prescriptions:   Requested Prescriptions     Pending Prescriptions Disp Refills    Tirzepatide 5 MG/0.5ML solution auto-injector 2 mL 0     Sig: Inject 5 mg under the skin into the appropriate area as directed 1 (One) Time Per Week.        Pharmacy where request should be sent: [id number] - [place], [place] - [address] [facility] AT [facility] OF [address] & [place] - [phone] [facility] [place] [phone] [place]     Last office visit with prescribing clinician: 10/31/2024   Last telemedicine visit with prescribing clinician: Visit date not found   Next office visit with prescribing clinician: 2/3/2025     Additional details provided by patient:      THE PATIENT SAID IT SHOULD BE THE NEXT DOSAGE UP        Evelyn West Rep   01/08/25 14:14 EST

## 2025-02-03 ENCOUNTER — OFFICE VISIT (OUTPATIENT)
Dept: FAMILY MEDICINE CLINIC | Facility: CLINIC | Age: 30
End: 2025-02-03
Payer: COMMERCIAL

## 2025-02-03 VITALS
HEIGHT: 65 IN | OXYGEN SATURATION: 97 % | DIASTOLIC BLOOD PRESSURE: 80 MMHG | TEMPERATURE: 97.3 F | WEIGHT: 247 LBS | BODY MASS INDEX: 41.15 KG/M2 | SYSTOLIC BLOOD PRESSURE: 110 MMHG | HEART RATE: 82 BPM

## 2025-02-03 DIAGNOSIS — E78.2 MIXED HYPERLIPIDEMIA: ICD-10-CM

## 2025-02-03 DIAGNOSIS — J45.20 MILD INTERMITTENT ASTHMA, UNSPECIFIED WHETHER COMPLICATED: ICD-10-CM

## 2025-02-03 DIAGNOSIS — E66.01 CLASS 3 SEVERE OBESITY DUE TO EXCESS CALORIES WITHOUT SERIOUS COMORBIDITY WITH BODY MASS INDEX (BMI) OF 40.0 TO 44.9 IN ADULT: Primary | ICD-10-CM

## 2025-02-03 DIAGNOSIS — E11.9 TYPE 2 DIABETES MELLITUS WITHOUT COMPLICATION, WITHOUT LONG-TERM CURRENT USE OF INSULIN: ICD-10-CM

## 2025-02-03 DIAGNOSIS — E66.813 CLASS 3 SEVERE OBESITY DUE TO EXCESS CALORIES WITHOUT SERIOUS COMORBIDITY WITH BODY MASS INDEX (BMI) OF 40.0 TO 44.9 IN ADULT: Primary | ICD-10-CM

## 2025-02-03 PROCEDURE — 99214 OFFICE O/P EST MOD 30 MIN: CPT | Performed by: STUDENT IN AN ORGANIZED HEALTH CARE EDUCATION/TRAINING PROGRAM

## 2025-02-03 RX ORDER — ALBUTEROL SULFATE 90 UG/1
2 INHALANT RESPIRATORY (INHALATION) EVERY 4 HOURS PRN
Qty: 8 G | Refills: 5 | Status: SHIPPED | OUTPATIENT
Start: 2025-02-03

## 2025-02-03 NOTE — PROGRESS NOTES
"Chief Complaint  Diabetes    Subjective          DesMonique Mell Isaac presents to [facility]  Diabetes          History of Present Illness  The patient presents for evaluation of diabetes.    She is currently on a regimen of Mounjaro 5 mg, which was intended to be increased to 7.5 mg. However, due to a reordering error, she has been maintained on the 5 mg dosage. She has expressed interest in exploring surgical options for weight loss if the current medication does not yield significant results.    She is on Lexapro and BuSpar. She requests a refill of her albuterol prescription.    MEDICATIONS  Current: Mounjaro, Lexapro, albuterol, BuSpar      Current Outpatient Medications   Medication Instructions    Accu-Chek Guide test strip 1 each, Other, Daily, use to check blood sugar every day    Accu-Chek Softclix Lancets lancets USE TO TEST BLOOD SUGAR EVERY DAY    albuterol sulfate  (90 Base) MCG/ACT inhaler 2 puffs, Inhalation, Every 4 Hours PRN    Blood Glucose Monitoring Suppl (Accu-Chek Guide) w/Device kit USE AS DIRECTED TO CHECK BLOOD SUGAR EVERY DAY    busPIRone (BUSPAR) 5 mg, Oral, 2 Times Daily PRN    escitalopram (LEXAPRO) 20 mg, Oral, Daily    Tirzepatide 7.5 mg, Subcutaneous, Weekly    [START ON 3/3/2025] Tirzepatide 10 mg, Subcutaneous, Weekly       The following portions of the patient's history were reviewed and updated as appropriate: allergies, current medications, past family history, past medical history, past social history, past surgical history, and problem list.    Objective   Vital Signs:   /80   Pulse 82   Temp 97.3 °F (36.3 °C)   Ht 165.1 cm (65\")   Wt 112 kg (247 lb)   SpO2 97%   BMI 41.10 kg/m²     BP Readings from Last 3 Encounters:   02/03/25 110/80   08/13/24 139/89   07/21/24 137/63     Wt Readings from Last 3 Encounters:   02/03/25 112 kg (247 lb)   10/31/24 113 kg (250 lb)   08/28/24 117 kg (257 lb)           Physical " Exam  Constitutional:       Appearance: Normal appearance. She is obese.   HENT:      Head: Normocephalic.      Nose: Nose normal.      Mouth/Throat:      Mouth: Mucous membranes are moist.   Eyes:      Pupils: Pupils are equal, round, and reactive to light.   Cardiovascular:      Rate and Rhythm: Normal rate and regular rhythm.   Pulmonary:      Effort: Pulmonary effort is normal.   Abdominal:      General: Abdomen is flat.   Musculoskeletal:         General: Normal range of motion.      Cervical back: Normal range of motion.   Skin:     General: Skin is warm and dry.   Neurological:      General: No focal deficit present.      Mental Status: She is alert.   Psychiatric:         Mood and Affect: Mood normal.         Thought Content: Thought content normal.            Result Review :   The following data was reviewed by: Chino Beck MD on 02/03/2025:  Common labs          4/25/2024    10:00 11/1/2024    09:19   Common Labs   Glucose 117  120    BUN 8  10    Creatinine 0.54  0.61    Sodium 140  143    Potassium 4.1  4.1    Chloride 106  105    Calcium 8.9  9.3    Albumin 4.3  4.3    Total Bilirubin 0.5  0.5    Alkaline Phosphatase 50  64    AST (SGOT) 20  16    ALT (SGPT) 33  18    WBC 5.60  6.75    Hemoglobin 13.5  13.4    Hematocrit 41.2  41.0    Platelets 309  382    Total Cholesterol 141  162    Triglycerides 107  72    HDL Cholesterol 35  35    LDL Cholesterol  86  113    Hemoglobin A1C 6.50  6.80    Microalbumin, Urine 5.6              Lab Results   Component Value Date    SARSANTIGEN Detected (A) 03/25/2023    COVID19 Not Detected 09/10/2024    RAPFLUA Negative 09/10/2024    RAPFLUB Negative 09/10/2024    RAPSCRN Negative 07/14/2022       Procedures        Assessment and Plan    Diagnoses and all orders for this visit:    1. Class 3 severe obesity due to excess calories without serious comorbidity with body mass index (BMI) of 40.0 to 44.9 in adult (Primary)    2. Type 2 diabetes mellitus without  complication, without long-term current use of insulin  -     Tirzepatide 7.5 MG/0.5ML solution auto-injector; Inject 7.5 mg under the skin into the appropriate area as directed 1 (One) Time Per Week for 30 days.  Dispense: 3 mL; Refill: 0  -     Tirzepatide 10 MG/0.5ML solution auto-injector; Inject 10 mg under the skin into the appropriate area as directed 1 (One) Time Per Week for 30 days.  Dispense: 2 mL; Refill: 0    3. Mixed hyperlipidemia    4. Mild intermittent asthma, unspecified whether complicated  -     albuterol sulfate  (90 Base) MCG/ACT inhaler; Inhale 2 puffs Every 4 (Four) Hours As Needed for Wheezing or Shortness of Air.  Dispense: 8 g; Refill: 5          Assessment & Plan  1. Diabetes mellitus.  Her weight has decreased from 259 to 247 pounds, indicating a positive response to the current treatment regimen. A prescription for Mounjaro 7.5 mg will be provided for a duration of 30 days. Subsequently, starting in March, the dosage will be escalated to 10 mg. She is advised to contact the clinic if she experiences any symptoms such as nausea or if she feels ready to increase the dosage further.    2. Medication management.  A prescription for albuterol will be renewed.    Follow-up  The patient will follow up in 8 weeks.      Medications Discontinued During This Encounter   Medication Reason    Tirzepatide 5 MG/0.5ML solution auto-injector     albuterol sulfate  (90 Base) MCG/ACT inhaler Reorder          Follow Up   Return in about 8 weeks (around 3/31/2025).  Patient was given instructions and counseling regarding her condition or for health maintenance advice. Please see specific information pulled into the AVS if appropriate.       Chino Beck MD  02/03/25  08:41 EST      Patient or patient representative verbalized consent for the use of Ambient Listening during the visit with  Chino Beck MD for chart documentation. 2/3/2025  08:32 EST

## 2025-04-01 ENCOUNTER — OFFICE VISIT (OUTPATIENT)
Dept: FAMILY MEDICINE CLINIC | Facility: CLINIC | Age: 30
End: 2025-04-01
Payer: COMMERCIAL

## 2025-04-01 VITALS
TEMPERATURE: 97.5 F | DIASTOLIC BLOOD PRESSURE: 70 MMHG | HEART RATE: 83 BPM | BODY MASS INDEX: 40.32 KG/M2 | SYSTOLIC BLOOD PRESSURE: 110 MMHG | HEIGHT: 65 IN | WEIGHT: 242 LBS | OXYGEN SATURATION: 99 %

## 2025-04-01 DIAGNOSIS — R20.0 NUMBNESS AND TINGLING OF LEFT LEG: ICD-10-CM

## 2025-04-01 DIAGNOSIS — E66.01 CLASS 3 SEVERE OBESITY DUE TO EXCESS CALORIES WITHOUT SERIOUS COMORBIDITY WITH BODY MASS INDEX (BMI) OF 40.0 TO 44.9 IN ADULT: Primary | ICD-10-CM

## 2025-04-01 DIAGNOSIS — E66.813 CLASS 3 SEVERE OBESITY DUE TO EXCESS CALORIES WITHOUT SERIOUS COMORBIDITY WITH BODY MASS INDEX (BMI) OF 40.0 TO 44.9 IN ADULT: Primary | ICD-10-CM

## 2025-04-01 DIAGNOSIS — R20.2 NUMBNESS AND TINGLING OF LEFT LEG: ICD-10-CM

## 2025-04-01 DIAGNOSIS — E11.9 TYPE 2 DIABETES MELLITUS WITHOUT COMPLICATION, WITHOUT LONG-TERM CURRENT USE OF INSULIN: ICD-10-CM

## 2025-04-01 DIAGNOSIS — F33.0 MILD EPISODE OF RECURRENT MAJOR DEPRESSIVE DISORDER: ICD-10-CM

## 2025-04-01 LAB
ALBUMIN UR-MCNC: 3.2 MG/DL
CREAT UR-MCNC: 239 MG/DL
MICROALBUMIN/CREAT UR: 13.4 MG/G (ref 0–29)

## 2025-04-01 PROCEDURE — 82043 UR ALBUMIN QUANTITATIVE: CPT | Performed by: STUDENT IN AN ORGANIZED HEALTH CARE EDUCATION/TRAINING PROGRAM

## 2025-04-01 PROCEDURE — 82570 ASSAY OF URINE CREATININE: CPT | Performed by: STUDENT IN AN ORGANIZED HEALTH CARE EDUCATION/TRAINING PROGRAM

## 2025-04-01 RX ORDER — ESCITALOPRAM OXALATE 20 MG/1
20 TABLET ORAL DAILY
Qty: 90 TABLET | Refills: 2 | Status: SHIPPED | OUTPATIENT
Start: 2025-04-01

## 2025-04-01 NOTE — PROGRESS NOTES
"Chief Complaint  Diabetes    Subjective          DesMonique Mell Isaac presents to [facility]  History of Present Illness      History of Present Illness  The patient presents for a general follow-up.    She is currently on a regimen of Mounjaro 10 mg, administered weekly, for the management of her diabetes. She reports that her anxiety symptoms are well-managed with Lexapro. Additionally, she notes an improvement in the numbness and tingling sensations previously experienced in her left leg.    MEDICATIONS  Mounjaro, Lexapro      Current Outpatient Medications   Medication Instructions    Accu-Chek Guide test strip 1 each, Other, Daily, use to check blood sugar every day    Accu-Chek Softclix Lancets lancets USE TO TEST BLOOD SUGAR EVERY DAY    albuterol sulfate  (90 Base) MCG/ACT inhaler 2 puffs, Inhalation, Every 4 Hours PRN    Blood Glucose Monitoring Suppl (Accu-Chek Guide) w/Device kit USE AS DIRECTED TO CHECK BLOOD SUGAR EVERY DAY    busPIRone (BUSPAR) 5 mg, Oral, 2 Times Daily PRN    escitalopram (LEXAPRO) 20 mg, Oral, Daily    Tirzepatide 12.5 mg, Subcutaneous, Weekly       The following portions of the patient's history were reviewed and updated as appropriate: allergies, current medications, past family history, past medical history, past social history, past surgical history, and problem list.    Objective   Vital Signs:   /70   Pulse 83   Temp 97.5 °F (36.4 °C)   Ht 165.1 cm (65\")   Wt 110 kg (242 lb)   SpO2 99%   BMI 40.27 kg/m²     BP Readings from Last 3 Encounters:   04/01/25 110/70   02/03/25 110/80   08/13/24 139/89     Wt Readings from Last 3 Encounters:   04/01/25 110 kg (242 lb)   02/03/25 112 kg (247 lb)   10/31/24 113 kg (250 lb)           Physical Exam   General:  AAO x3, no acute distress.  Eyes:  EOMI, PERRLA  Ears/Nose/Mouth/Throat:  Moist mucous membranes  Respiratory:  CTA bilaterally, no wheezes rales or rhonchi  Cardiovascular:  " RRR no murmur rubs or gallops  Gastrointestinal:  Abdomen soft nondistended nontender bowel sounds present x4 quadrants  Musculoskeletal:  Moves all 4 extremities spontaneously, no apparent weakness  Skin:  Warm, dry, no skin rashes or lesions  Neurologic:  AAO x3, cranial nerves 2-12 grossly intact, no focal neuro deficits  Psychiatric:  Normal mood and affect    Result Review :   The following data was reviewed by: Chino Beck MD on 04/01/2025:  Common labs          4/25/2024    10:00 11/1/2024    09:19   Common Labs   Glucose 117  120    BUN 8  10    Creatinine 0.54  0.61    Sodium 140  143    Potassium 4.1  4.1    Chloride 106  105    Calcium 8.9  9.3    Albumin 4.3  4.3    Total Bilirubin 0.5  0.5    Alkaline Phosphatase 50  64    AST (SGOT) 20  16    ALT (SGPT) 33  18    WBC 5.60  6.75    Hemoglobin 13.5  13.4    Hematocrit 41.2  41.0    Platelets 309  382    Total Cholesterol 141  162    Triglycerides 107  72    HDL Cholesterol 35  35    LDL Cholesterol  86  113    Hemoglobin A1C 6.50  6.80    Microalbumin, Urine 5.6              Lab Results   Component Value Date    SARSANTIGEN Detected (A) 03/25/2023    COVID19 Not Detected 09/10/2024    RAPFLUA Negative 09/10/2024    RAPFLUB Negative 09/10/2024    RAPSCRN Negative 07/14/2022       Procedures        Assessment and Plan    Diagnoses and all orders for this visit:    1. Class 3 severe obesity due to excess calories without serious comorbidity with body mass index (BMI) of 40.0 to 44.9 in adult (Primary)    2. Type 2 diabetes mellitus without complication, without long-term current use of insulin  -     Microalbumin / Creatinine Urine Ratio - Urine, Clean Catch  -     Tirzepatide 12.5 MG/0.5ML solution auto-injector; Inject 0.5 mL under the skin into the appropriate area as directed 1 (One) Time Per Week.  Dispense: 2 mL; Refill: 0    3. Numbness and tingling of left leg    4. Mild episode of recurrent major depressive disorder  -     escitalopram (Lexapro)  20 MG tablet; Take 1 tablet by mouth Daily.  Dispense: 90 tablet; Refill: 2          Assessment & Plan  1. Diabetes Mellitus.  Her last A1c was 6.8 in November. She is currently on Mounjaro 10 mg weekly. The dosage will be increased to 12.5 mg weekly for a month, and then to 15 mg weekly. She is advised to monitor her blood sugar levels and report any issues promptly. An A1c test will be conducted during her annual visit in 4 weeks.    2. Anxiety.  She reports that her anxiety is well-controlled with Lexapro. A refill for Lexapro has been sent to [facility] in [place]. She is advised to continue her current dosage and report any changes in symptoms.    3. Numbness and Tingling in Left Leg.  She reports improvement in the numbness and tingling of her left leg. No changes in treatment are necessary at this time.    4. Weight Management.  She has lost 5 pounds since the last visit, now weighing 242 pounds. The increase in Mounjaro dosage is expected to further aid in weight loss. She is advised to continue her current lifestyle modifications and report any concerns.    Follow-up  The patient will follow up in 4 weeks for her annual visit and lab work.      Medications Discontinued During This Encounter   Medication Reason    Tirzepatide 10 MG/0.5ML solution auto-injector     escitalopram (Lexapro) 20 MG tablet Reorder          Follow Up   Return in about 4 weeks (around 4/29/2025).  Patient was given instructions and counseling regarding her condition or for health maintenance advice. Please see specific information pulled into the AVS if appropriate.       Chino Beck MD  04/01/25  10:01 EDT      Patient or patient representative verbalized consent for the use of Ambient Listening during the visit with  Chino Beck MD for chart documentation. 4/1/2025  08:56 EDT

## 2025-04-22 ENCOUNTER — TELEPHONE (OUTPATIENT)
Dept: FAMILY MEDICINE CLINIC | Facility: CLINIC | Age: 30
End: 2025-04-22
Payer: COMMERCIAL

## 2025-04-22 NOTE — TELEPHONE ENCOUNTER
Caller: DesMonique  Relationship: self   Best call back number:   Who are you requesting to speak with (clinical staff, provider, specific staff member):    MA    What was the call regarding:    Patient was returning our call regarding the need to reschedule her visit; we got that taken care of.

## 2025-05-07 ENCOUNTER — OFFICE VISIT (OUTPATIENT)
Dept: FAMILY MEDICINE CLINIC | Facility: CLINIC | Age: 30
End: 2025-05-07
Payer: COMMERCIAL

## 2025-05-07 VITALS
OXYGEN SATURATION: 96 % | WEIGHT: 238 LBS | BODY MASS INDEX: 39.65 KG/M2 | HEIGHT: 65 IN | HEART RATE: 92 BPM | DIASTOLIC BLOOD PRESSURE: 80 MMHG | TEMPERATURE: 97.3 F | SYSTOLIC BLOOD PRESSURE: 118 MMHG

## 2025-05-07 DIAGNOSIS — Z00.00 ANNUAL PHYSICAL EXAM: Primary | ICD-10-CM

## 2025-05-07 DIAGNOSIS — E78.2 MIXED HYPERLIPIDEMIA: ICD-10-CM

## 2025-05-07 DIAGNOSIS — E11.9 TYPE 2 DIABETES MELLITUS WITHOUT COMPLICATION, WITHOUT LONG-TERM CURRENT USE OF INSULIN: ICD-10-CM

## 2025-05-07 DIAGNOSIS — F33.0 MILD EPISODE OF RECURRENT MAJOR DEPRESSIVE DISORDER: ICD-10-CM

## 2025-05-07 DIAGNOSIS — F41.1 GAD (GENERALIZED ANXIETY DISORDER): ICD-10-CM

## 2025-05-07 LAB
ALBUMIN SERPL-MCNC: 4.6 G/DL (ref 3.5–5.2)
ALBUMIN/GLOB SERPL: 1.7 G/DL
ALP SERPL-CCNC: 67 U/L (ref 39–117)
ALT SERPL W P-5'-P-CCNC: 22 U/L (ref 1–33)
ANION GAP SERPL CALCULATED.3IONS-SCNC: 8.1 MMOL/L (ref 5–15)
AST SERPL-CCNC: 16 U/L (ref 1–32)
BASOPHILS # BLD AUTO: 0.04 10*3/MM3 (ref 0–0.2)
BASOPHILS NFR BLD AUTO: 0.7 % (ref 0–1.5)
BILIRUB SERPL-MCNC: 0.6 MG/DL (ref 0–1.2)
BUN SERPL-MCNC: 8 MG/DL (ref 6–20)
BUN/CREAT SERPL: 11.6 (ref 7–25)
CALCIUM SPEC-SCNC: 9.4 MG/DL (ref 8.6–10.5)
CHLORIDE SERPL-SCNC: 102 MMOL/L (ref 98–107)
CHOLEST SERPL-MCNC: 158 MG/DL (ref 0–200)
CO2 SERPL-SCNC: 26.9 MMOL/L (ref 22–29)
CREAT SERPL-MCNC: 0.69 MG/DL (ref 0.57–1)
DEPRECATED RDW RBC AUTO: 37.4 FL (ref 37–54)
EGFRCR SERPLBLD CKD-EPI 2021: 119.9 ML/MIN/1.73
EOSINOPHIL # BLD AUTO: 0.18 10*3/MM3 (ref 0–0.4)
EOSINOPHIL NFR BLD AUTO: 3.2 % (ref 0.3–6.2)
ERYTHROCYTE [DISTWIDTH] IN BLOOD BY AUTOMATED COUNT: 12.3 % (ref 12.3–15.4)
GLOBULIN UR ELPH-MCNC: 2.7 GM/DL
GLUCOSE SERPL-MCNC: 96 MG/DL (ref 65–99)
HBA1C MFR BLD: 6.1 % (ref 4.8–5.6)
HCT VFR BLD AUTO: 40.4 % (ref 34–46.6)
HDLC SERPL-MCNC: 34 MG/DL (ref 40–60)
HGB BLD-MCNC: 13.4 G/DL (ref 12–15.9)
IMM GRANULOCYTES # BLD AUTO: 0.01 10*3/MM3 (ref 0–0.05)
IMM GRANULOCYTES NFR BLD AUTO: 0.2 % (ref 0–0.5)
LDLC SERPL CALC-MCNC: 113 MG/DL (ref 0–100)
LDLC/HDLC SERPL: 3.32 {RATIO}
LYMPHOCYTES # BLD AUTO: 1.83 10*3/MM3 (ref 0.7–3.1)
LYMPHOCYTES NFR BLD AUTO: 32.5 % (ref 19.6–45.3)
MCH RBC QN AUTO: 28.2 PG (ref 26.6–33)
MCHC RBC AUTO-ENTMCNC: 33.2 G/DL (ref 31.5–35.7)
MCV RBC AUTO: 85.1 FL (ref 79–97)
MONOCYTES # BLD AUTO: 0.47 10*3/MM3 (ref 0.1–0.9)
MONOCYTES NFR BLD AUTO: 8.3 % (ref 5–12)
NEUTROPHILS NFR BLD AUTO: 3.1 10*3/MM3 (ref 1.7–7)
NEUTROPHILS NFR BLD AUTO: 55.1 % (ref 42.7–76)
NRBC BLD AUTO-RTO: 0 /100 WBC (ref 0–0.2)
PLATELET # BLD AUTO: 408 10*3/MM3 (ref 140–450)
PMV BLD AUTO: 10.4 FL (ref 6–12)
POTASSIUM SERPL-SCNC: 4.2 MMOL/L (ref 3.5–5.2)
PROT SERPL-MCNC: 7.3 G/DL (ref 6–8.5)
RBC # BLD AUTO: 4.75 10*6/MM3 (ref 3.77–5.28)
SODIUM SERPL-SCNC: 137 MMOL/L (ref 136–145)
TRIGL SERPL-MCNC: 55 MG/DL (ref 0–150)
TSH SERPL DL<=0.05 MIU/L-ACNC: 1.18 UIU/ML (ref 0.27–4.2)
VLDLC SERPL-MCNC: 11 MG/DL (ref 5–40)
WBC NRBC COR # BLD AUTO: 5.63 10*3/MM3 (ref 3.4–10.8)

## 2025-05-07 PROCEDURE — 83036 HEMOGLOBIN GLYCOSYLATED A1C: CPT | Performed by: STUDENT IN AN ORGANIZED HEALTH CARE EDUCATION/TRAINING PROGRAM

## 2025-05-07 PROCEDURE — 84443 ASSAY THYROID STIM HORMONE: CPT | Performed by: STUDENT IN AN ORGANIZED HEALTH CARE EDUCATION/TRAINING PROGRAM

## 2025-05-07 PROCEDURE — 80061 LIPID PANEL: CPT | Performed by: STUDENT IN AN ORGANIZED HEALTH CARE EDUCATION/TRAINING PROGRAM

## 2025-05-07 PROCEDURE — 85025 COMPLETE CBC W/AUTO DIFF WBC: CPT | Performed by: STUDENT IN AN ORGANIZED HEALTH CARE EDUCATION/TRAINING PROGRAM

## 2025-05-07 PROCEDURE — 80053 COMPREHEN METABOLIC PANEL: CPT | Performed by: STUDENT IN AN ORGANIZED HEALTH CARE EDUCATION/TRAINING PROGRAM

## 2025-05-07 NOTE — PROGRESS NOTES
"Chief Complaint  Annual Exam and Unprotected Sex (Would like STD screening)    Subjective          Analisa Mell Isaac presents to [facility]  History of Present Illness      History of Present Illness  The patient presents for an annual physical exam.    She reports a satisfactory response to her current medication regimen, with no experienced nausea. She has been fasting today in preparation for her lab work. She is on BuSpar 5 mg as needed for anxiety, Lexapro 20 mg daily for anxiety and depression, and Mounjaro for diabetes. She uses an albuterol inhaler.     Her A1c has been 7 in the past, with a recent value of 6.9. She has a history of asthma but reports no recent exacerbations despite the presence of allergies. She denies smoking.    SOCIAL HISTORY  She does not smoke.      Current Outpatient Medications   Medication Instructions    Accu-Chek Guide test strip 1 each, Other, Daily, use to check blood sugar every day    Accu-Chek Softclix Lancets lancets USE TO TEST BLOOD SUGAR EVERY DAY    albuterol sulfate  (90 Base) MCG/ACT inhaler 2 puffs, Inhalation, Every 4 Hours PRN    Blood Glucose Monitoring Suppl (Accu-Chek Guide) w/Device kit USE AS DIRECTED TO CHECK BLOOD SUGAR EVERY DAY    busPIRone (BUSPAR) 5 mg, Oral, 2 Times Daily PRN    escitalopram (LEXAPRO) 20 mg, Oral, Daily    Tirzepatide 15 mg, Subcutaneous, Weekly       The following portions of the patient's history were reviewed and updated as appropriate: allergies, current medications, past family history, past medical history, past social history, past surgical history, and problem list.    Objective   Vital Signs:   /80   Pulse 92   Temp 97.3 °F (36.3 °C)   Ht 165.1 cm (65\")   Wt 108 kg (238 lb)   SpO2 96%   BMI 39.61 kg/m²     BP Readings from Last 3 Encounters:   05/07/25 118/80   04/01/25 110/70   02/03/25 110/80     Wt Readings from Last 3 Encounters:   05/07/25 108 kg (238 lb)   04/01/25 " 110 kg (242 lb)   02/03/25 112 kg (247 lb)           Physical Exam  Constitutional:       Appearance: Normal appearance.   HENT:      Head: Normocephalic.      Nose: Nose normal.      Mouth/Throat:      Mouth: Mucous membranes are moist.   Eyes:      Pupils: Pupils are equal, round, and reactive to light.   Cardiovascular:      Rate and Rhythm: Normal rate and regular rhythm.   Pulmonary:      Effort: Pulmonary effort is normal.   Abdominal:      General: Abdomen is flat.   Musculoskeletal:         General: Normal range of motion.      Cervical back: Normal range of motion.   Skin:     General: Skin is warm and dry.   Neurological:      General: No focal deficit present.      Mental Status: She is alert.   Psychiatric:         Mood and Affect: Mood normal.         Thought Content: Thought content normal.            Result Review :   The following data was reviewed by: Chino Beck MD on 05/07/2025:  Common labs          11/1/2024    09:19 4/1/2025    09:09   Common Labs   Glucose 120     BUN 10     Creatinine 0.61     Sodium 143     Potassium 4.1     Chloride 105     Calcium 9.3     Albumin 4.3     Total Bilirubin 0.5     Alkaline Phosphatase 64     AST (SGOT) 16     ALT (SGPT) 18     WBC 6.75     Hemoglobin 13.4     Hematocrit 41.0     Platelets 382     Total Cholesterol 162     Triglycerides 72     HDL Cholesterol 35     LDL Cholesterol  113     Hemoglobin A1C 6.80     Microalbumin, Urine  3.2             Lab Results   Component Value Date    SARSANTIGEN Detected (A) 03/25/2023    COVID19 Not Detected 09/10/2024    RAPFLUA Negative 09/10/2024    RAPFLUB Negative 09/10/2024    RAPSCRN Negative 07/14/2022       Procedures        Assessment and Plan    Diagnoses and all orders for this visit:    1. Annual physical exam (Primary)    2. Type 2 diabetes mellitus without complication, without long-term current use of insulin  -     ORDER: Hemoglobin A1c; Future  -     Tirzepatide 15 MG/0.5ML solution auto-injector;  Inject 15 mg under the skin into the appropriate area as directed 1 (One) Time Per Week.  Dispense: 3 mL; Refill: 3    3. Mild episode of recurrent major depressive disorder    4. Mixed hyperlipidemia    5. KAILA (generalized anxiety disorder)          Assessment & Plan  1. Annual physical examination.  - Weight has decreased from 250 to 238 pounds, indicating a loss of 12 pounds.  - A comprehensive set of laboratory tests will be conducted today.  - Due for a pneumonia vaccine, but given her young age, it can be deferred.  - No issues reported with asthma despite the current allergy season.    2. Diabetes mellitus.  - A1c level has been recorded as 6.9.  - Urine microalbumin test performed on 04/01/2025 yielded normal results.  - Prescription for Mounjaro with three refills has been issued and sent to pharmacy.  - Advised to continue monitoring blood sugar levels using lancets.    3. Asthma.  - No issues reported with asthma despite the current allergy season.  - Currently using albuterol as needed.  - Physical examination of heart and lungs conducted.  - No new symptoms or exacerbations noted.    4. Anxiety and depression.  - Currently taking Lexapro 20 mg daily for anxiety and depression.  - Uses BuSpar 5 mg as needed for anxiety.  - Medication review conducted during the visit.  - No reported side effects or issues with current medication regimen.    5. Elevated cholesterol.  - Previous labs indicated slightly elevated LDL.  - Cholesterol levels will be checked today.  - Last cholesterol check showed slightly elevated levels.  - Further evaluation and management will be based on today's test results.    Follow-up  - Follow-up scheduled in 1 month.      Medications Discontinued During This Encounter   Medication Reason    Tirzepatide 12.5 MG/0.5ML solution auto-injector           Follow Up   No follow-ups on file.  Patient was given instructions and counseling regarding her condition or for health maintenance  advice. Please see specific information pulled into the AVS if appropriate.       Chino Beck MD  05/07/25  09:20 EDT      Patient or patient representative verbalized consent for the use of Ambient Listening during the visit with  Chino Beck MD for chart documentation. 5/7/2025  09:18 EDT

## 2025-06-11 DIAGNOSIS — E11.9 TYPE 2 DIABETES MELLITUS WITHOUT COMPLICATION, WITHOUT LONG-TERM CURRENT USE OF INSULIN: ICD-10-CM

## 2025-07-13 DIAGNOSIS — E11.9 TYPE 2 DIABETES MELLITUS WITHOUT COMPLICATION, WITHOUT LONG-TERM CURRENT USE OF INSULIN: ICD-10-CM

## 2025-08-13 DIAGNOSIS — E11.9 TYPE 2 DIABETES MELLITUS WITHOUT COMPLICATION, WITHOUT LONG-TERM CURRENT USE OF INSULIN: ICD-10-CM
